# Patient Record
Sex: FEMALE | URBAN - METROPOLITAN AREA
[De-identification: names, ages, dates, MRNs, and addresses within clinical notes are randomized per-mention and may not be internally consistent; named-entity substitution may affect disease eponyms.]

---

## 2022-01-03 ENCOUNTER — TELEPHONE (OUTPATIENT)
Dept: ADMINISTRATIVE | Facility: OTHER | Age: 22
End: 2022-01-03

## 2022-01-04 ENCOUNTER — HOSPITAL ENCOUNTER (INPATIENT)
Facility: HOSPITAL | Age: 22
LOS: 9 days | Discharge: HOME | End: 2022-01-13
Attending: OBSTETRICS & GYNECOLOGY | Admitting: OBSTETRICS & GYNECOLOGY
Payer: MEDICAID

## 2022-01-04 DIAGNOSIS — O42.919 PRETERM PREMATURE RUPTURE OF MEMBRANES (PPROM) WITH UNKNOWN ONSET OF LABOR: Primary | ICD-10-CM

## 2022-01-04 DIAGNOSIS — R10.9 ABDOMINAL PAIN AFFECTING PREGNANCY: ICD-10-CM

## 2022-01-04 DIAGNOSIS — O26.899 ABDOMINAL PAIN AFFECTING PREGNANCY: ICD-10-CM

## 2022-01-04 LAB
ABO + RH BLD: NORMAL
ALBUMIN SERPL BCP-MCNC: 3 G/DL (ref 3.5–5.2)
ALP SERPL-CCNC: 79 U/L (ref 55–135)
ALT SERPL W/O P-5'-P-CCNC: 9 U/L (ref 10–44)
ANION GAP SERPL CALC-SCNC: 9 MMOL/L (ref 8–16)
AST SERPL-CCNC: 14 U/L (ref 10–40)
BASOPHILS # BLD AUTO: 0.01 K/UL (ref 0–0.2)
BASOPHILS NFR BLD: 0.1 % (ref 0–1.9)
BILIRUB SERPL-MCNC: 0.5 MG/DL (ref 0.1–1)
BLD GP AB SCN CELLS X3 SERPL QL: NORMAL
BUN SERPL-MCNC: 7 MG/DL (ref 6–20)
CALCIUM SERPL-MCNC: 8.9 MG/DL (ref 8.7–10.5)
CHLORIDE SERPL-SCNC: 106 MMOL/L (ref 95–110)
CO2 SERPL-SCNC: 21 MMOL/L (ref 23–29)
CREAT SERPL-MCNC: 0.6 MG/DL (ref 0.5–1.4)
DIFFERENTIAL METHOD: ABNORMAL
EOSINOPHIL # BLD AUTO: 0.1 K/UL (ref 0–0.5)
EOSINOPHIL NFR BLD: 1.3 % (ref 0–8)
ERYTHROCYTE [DISTWIDTH] IN BLOOD BY AUTOMATED COUNT: 13.3 % (ref 11.5–14.5)
EST. GFR  (AFRICAN AMERICAN): >60 ML/MIN/1.73 M^2
EST. GFR  (NON AFRICAN AMERICAN): >60 ML/MIN/1.73 M^2
GLUCOSE SERPL-MCNC: 114 MG/DL (ref 70–110)
HCT VFR BLD AUTO: 29.4 % (ref 37–48.5)
HGB BLD-MCNC: 9.8 G/DL (ref 12–16)
HIV1+2 IGG SERPL QL IA.RAPID: NORMAL
IMM GRANULOCYTES # BLD AUTO: 0.08 K/UL (ref 0–0.04)
IMM GRANULOCYTES NFR BLD AUTO: 0.9 % (ref 0–0.5)
LYMPHOCYTES # BLD AUTO: 2.2 K/UL (ref 1–4.8)
LYMPHOCYTES NFR BLD: 24.7 % (ref 18–48)
MCH RBC QN AUTO: 30.6 PG (ref 27–31)
MCHC RBC AUTO-ENTMCNC: 33.3 G/DL (ref 32–36)
MCV RBC AUTO: 92 FL (ref 82–98)
MONOCYTES # BLD AUTO: 0.7 K/UL (ref 0.3–1)
MONOCYTES NFR BLD: 8.4 % (ref 4–15)
NEUTROPHILS # BLD AUTO: 5.6 K/UL (ref 1.8–7.7)
NEUTROPHILS NFR BLD: 64.6 % (ref 38–73)
NRBC BLD-RTO: 0 /100 WBC
PLATELET # BLD AUTO: 215 K/UL (ref 150–450)
PMV BLD AUTO: 10.7 FL (ref 9.2–12.9)
POTASSIUM SERPL-SCNC: 3.8 MMOL/L (ref 3.5–5.1)
PROT SERPL-MCNC: 6.7 G/DL (ref 6–8.4)
RBC # BLD AUTO: 3.2 M/UL (ref 4–5.4)
SARS-COV-2 RDRP RESP QL NAA+PROBE: NEGATIVE
SODIUM SERPL-SCNC: 136 MMOL/L (ref 136–145)
WBC # BLD AUTO: 8.69 K/UL (ref 3.9–12.7)

## 2022-01-04 PROCEDURE — 36415 COLL VENOUS BLD VENIPUNCTURE: CPT | Performed by: OBSTETRICS & GYNECOLOGY

## 2022-01-04 PROCEDURE — 11000001 HC ACUTE MED/SURG PRIVATE ROOM

## 2022-01-04 PROCEDURE — 87340 HEPATITIS B SURFACE AG IA: CPT | Performed by: OBSTETRICS & GYNECOLOGY

## 2022-01-04 PROCEDURE — 86703 HIV-1/HIV-2 1 RESULT ANTBDY: CPT | Performed by: OBSTETRICS & GYNECOLOGY

## 2022-01-04 PROCEDURE — 86901 BLOOD TYPING SEROLOGIC RH(D): CPT | Performed by: OBSTETRICS & GYNECOLOGY

## 2022-01-04 PROCEDURE — 80307 DRUG TEST PRSMV CHEM ANLYZR: CPT | Performed by: OBSTETRICS & GYNECOLOGY

## 2022-01-04 PROCEDURE — 86762 RUBELLA ANTIBODY: CPT | Performed by: OBSTETRICS & GYNECOLOGY

## 2022-01-04 PROCEDURE — 86900 BLOOD TYPING SEROLOGIC ABO: CPT | Performed by: OBSTETRICS & GYNECOLOGY

## 2022-01-04 PROCEDURE — 80053 COMPREHEN METABOLIC PANEL: CPT | Performed by: OBSTETRICS & GYNECOLOGY

## 2022-01-04 PROCEDURE — 85025 COMPLETE CBC W/AUTO DIFF WBC: CPT | Performed by: OBSTETRICS & GYNECOLOGY

## 2022-01-04 PROCEDURE — U0002 COVID-19 LAB TEST NON-CDC: HCPCS | Performed by: OBSTETRICS & GYNECOLOGY

## 2022-01-04 PROCEDURE — 84112 EVAL AMNIOTIC FLUID PROTEIN: CPT | Performed by: OBSTETRICS & GYNECOLOGY

## 2022-01-04 PROCEDURE — 86592 SYPHILIS TEST NON-TREP QUAL: CPT | Performed by: OBSTETRICS & GYNECOLOGY

## 2022-01-04 RX ORDER — SODIUM CHLORIDE, SODIUM LACTATE, POTASSIUM CHLORIDE, CALCIUM CHLORIDE 600; 310; 30; 20 MG/100ML; MG/100ML; MG/100ML; MG/100ML
INJECTION, SOLUTION INTRAVENOUS CONTINUOUS
Status: DISCONTINUED | OUTPATIENT
Start: 2022-01-04 | End: 2022-01-05

## 2022-01-04 RX ORDER — ACETAMINOPHEN 500 MG
500 TABLET ORAL EVERY 6 HOURS PRN
Status: DISCONTINUED | OUTPATIENT
Start: 2022-01-04 | End: 2022-01-11 | Stop reason: ALTCHOICE

## 2022-01-04 RX ORDER — PROCHLORPERAZINE EDISYLATE 5 MG/ML
5 INJECTION INTRAMUSCULAR; INTRAVENOUS EVERY 6 HOURS PRN
Status: DISCONTINUED | OUTPATIENT
Start: 2022-01-04 | End: 2022-01-05

## 2022-01-04 RX ORDER — ONDANSETRON 8 MG/1
8 TABLET, ORALLY DISINTEGRATING ORAL EVERY 8 HOURS PRN
Status: DISCONTINUED | OUTPATIENT
Start: 2022-01-04 | End: 2022-01-05 | Stop reason: SDUPTHER

## 2022-01-05 ENCOUNTER — ANESTHESIA (OUTPATIENT)
Dept: OBSTETRICS AND GYNECOLOGY | Facility: HOSPITAL | Age: 22
End: 2022-01-05

## 2022-01-05 ENCOUNTER — ANESTHESIA EVENT (OUTPATIENT)
Dept: OBSTETRICS AND GYNECOLOGY | Facility: HOSPITAL | Age: 22
End: 2022-01-05

## 2022-01-05 ENCOUNTER — TELEPHONE (OUTPATIENT)
Dept: ADMINISTRATIVE | Facility: OTHER | Age: 22
End: 2022-01-05

## 2022-01-05 PROBLEM — O42.919 PRETERM PREMATURE RUPTURE OF MEMBRANES (PPROM) WITH UNKNOWN ONSET OF LABOR: Status: ACTIVE | Noted: 2022-01-05

## 2022-01-05 LAB
AMPHET+METHAMPHET UR QL: NEGATIVE
BARBITURATES UR QL SCN>200 NG/ML: NEGATIVE
BENZODIAZ UR QL SCN>200 NG/ML: NEGATIVE
BZE UR QL SCN: NEGATIVE
CANNABINOIDS UR QL SCN: NEGATIVE
CREAT UR-MCNC: 52 MG/DL (ref 15–325)
ETHANOL UR-MCNC: <10 MG/DL
HBV SURFACE AG SERPL QL IA: NEGATIVE
METHADONE UR QL SCN>300 NG/ML: NEGATIVE
OPIATES UR QL SCN: NEGATIVE
PCP UR QL SCN>25 NG/ML: NEGATIVE
RPR SER QL: NORMAL
RUBV IGG SER-ACNC: 29.3 IU/ML
RUBV IGG SER-IMP: REACTIVE
RUPTURE OF MEMBRANE: POSITIVE
TOXICOLOGY INFORMATION: NORMAL

## 2022-01-05 PROCEDURE — 87591 N.GONORRHOEAE DNA AMP PROB: CPT | Performed by: OBSTETRICS & GYNECOLOGY

## 2022-01-05 PROCEDURE — 63600175 PHARM REV CODE 636 W HCPCS: Performed by: OBSTETRICS & GYNECOLOGY

## 2022-01-05 PROCEDURE — 87081 CULTURE SCREEN ONLY: CPT | Performed by: OBSTETRICS & GYNECOLOGY

## 2022-01-05 PROCEDURE — 25000003 PHARM REV CODE 250: Performed by: OBSTETRICS & GYNECOLOGY

## 2022-01-05 PROCEDURE — 11000001 HC ACUTE MED/SURG PRIVATE ROOM

## 2022-01-05 PROCEDURE — 87491 CHLMYD TRACH DNA AMP PROBE: CPT | Performed by: OBSTETRICS & GYNECOLOGY

## 2022-01-05 RX ORDER — CALCIUM CARBONATE 200(500)MG
500 TABLET,CHEWABLE ORAL 3 TIMES DAILY PRN
Status: DISCONTINUED | OUTPATIENT
Start: 2022-01-05 | End: 2022-01-13 | Stop reason: HOSPADM

## 2022-01-05 RX ORDER — AZITHROMYCIN 500 MG/1
1000 TABLET, FILM COATED ORAL ONCE
Status: COMPLETED | OUTPATIENT
Start: 2022-01-05 | End: 2022-01-05

## 2022-01-05 RX ORDER — OXYTOCIN/RINGER'S LACTATE 30/500 ML
95 PLASTIC BAG, INJECTION (ML) INTRAVENOUS ONCE
Status: DISCONTINUED | OUTPATIENT
Start: 2022-01-05 | End: 2022-01-13 | Stop reason: HOSPADM

## 2022-01-05 RX ORDER — OXYTOCIN/RINGER'S LACTATE 30/500 ML
334 PLASTIC BAG, INJECTION (ML) INTRAVENOUS ONCE
Status: DISCONTINUED | OUTPATIENT
Start: 2022-01-05 | End: 2022-01-11 | Stop reason: ALTCHOICE

## 2022-01-05 RX ORDER — MISOPROSTOL 200 UG/1
800 TABLET ORAL
Status: DISCONTINUED | OUTPATIENT
Start: 2022-01-05 | End: 2022-01-11 | Stop reason: ALTCHOICE

## 2022-01-05 RX ORDER — SODIUM CHLORIDE 9 MG/ML
INJECTION, SOLUTION INTRAVENOUS
Status: DISCONTINUED | OUTPATIENT
Start: 2022-01-05 | End: 2022-01-11 | Stop reason: ALTCHOICE

## 2022-01-05 RX ORDER — BETAMETHASONE SODIUM PHOSPHATE AND BETAMETHASONE ACETATE 3; 3 MG/ML; MG/ML
6 INJECTION, SUSPENSION INTRA-ARTICULAR; INTRALESIONAL; INTRAMUSCULAR; SOFT TISSUE ONCE
Status: COMPLETED | OUTPATIENT
Start: 2022-01-05 | End: 2022-01-05

## 2022-01-05 RX ORDER — SODIUM CHLORIDE, SODIUM LACTATE, POTASSIUM CHLORIDE, CALCIUM CHLORIDE 600; 310; 30; 20 MG/100ML; MG/100ML; MG/100ML; MG/100ML
INJECTION, SOLUTION INTRAVENOUS CONTINUOUS
Status: DISCONTINUED | OUTPATIENT
Start: 2022-01-05 | End: 2022-01-07

## 2022-01-05 RX ORDER — MUPIROCIN 20 MG/G
OINTMENT TOPICAL
Status: DISCONTINUED | OUTPATIENT
Start: 2022-01-04 | End: 2022-01-13 | Stop reason: HOSPADM

## 2022-01-05 RX ORDER — SIMETHICONE 80 MG
1 TABLET,CHEWABLE ORAL 4 TIMES DAILY PRN
Status: DISCONTINUED | OUTPATIENT
Start: 2022-01-05 | End: 2022-01-11 | Stop reason: SDUPTHER

## 2022-01-05 RX ORDER — ONDANSETRON 8 MG/1
8 TABLET, ORALLY DISINTEGRATING ORAL EVERY 8 HOURS PRN
Status: DISCONTINUED | OUTPATIENT
Start: 2022-01-05 | End: 2022-01-11 | Stop reason: ALTCHOICE

## 2022-01-05 RX ORDER — AZITHROMYCIN 500 MG/1
1000 TABLET, FILM COATED ORAL ONCE
Status: COMPLETED | OUTPATIENT
Start: 2022-01-08 | End: 2022-01-08

## 2022-01-05 RX ORDER — TRANEXAMIC ACID 100 MG/ML
1000 INJECTION, SOLUTION INTRAVENOUS ONCE AS NEEDED
Status: DISCONTINUED | OUTPATIENT
Start: 2022-01-04 | End: 2022-01-11 | Stop reason: ALTCHOICE

## 2022-01-05 RX ORDER — DIPHENOXYLATE HYDROCHLORIDE AND ATROPINE SULFATE 2.5; .025 MG/1; MG/1
1 TABLET ORAL 4 TIMES DAILY PRN
Status: DISCONTINUED | OUTPATIENT
Start: 2022-01-05 | End: 2022-01-13 | Stop reason: HOSPADM

## 2022-01-05 RX ORDER — CARBOPROST TROMETHAMINE 250 UG/ML
250 INJECTION, SOLUTION INTRAMUSCULAR
Status: DISCONTINUED | OUTPATIENT
Start: 2022-01-05 | End: 2022-01-13 | Stop reason: HOSPADM

## 2022-01-05 RX ORDER — PROCHLORPERAZINE EDISYLATE 5 MG/ML
5 INJECTION INTRAMUSCULAR; INTRAVENOUS EVERY 6 HOURS PRN
Status: DISCONTINUED | OUTPATIENT
Start: 2022-01-05 | End: 2022-01-11 | Stop reason: ALTCHOICE

## 2022-01-05 RX ORDER — METHYLERGONOVINE MALEATE 0.2 MG/ML
200 INJECTION INTRAVENOUS
Status: DISCONTINUED | OUTPATIENT
Start: 2022-01-05 | End: 2022-01-11 | Stop reason: ALTCHOICE

## 2022-01-05 RX ORDER — FAMOTIDINE 20 MG/1
20 TABLET, FILM COATED ORAL DAILY
Status: DISCONTINUED | OUTPATIENT
Start: 2022-01-05 | End: 2022-01-11 | Stop reason: ALTCHOICE

## 2022-01-05 RX ADMIN — ERYTHROMYCIN LACTOBIONATE 250 MG: 500 INJECTION, POWDER, LYOPHILIZED, FOR SOLUTION INTRAVENOUS at 07:01

## 2022-01-05 RX ADMIN — AMPICILLIN 2 G: 2 INJECTION, POWDER, FOR SOLUTION INTRAMUSCULAR; INTRAVENOUS at 02:01

## 2022-01-05 RX ADMIN — AMPICILLIN 2 G: 2 INJECTION, POWDER, FOR SOLUTION INTRAMUSCULAR; INTRAVENOUS at 08:01

## 2022-01-05 RX ADMIN — BETAMETHASONE SODIUM PHOSPHATE AND BETAMETHASONE ACETATE 6 MG: 3; 3 INJECTION, SUSPENSION INTRA-ARTICULAR; INTRALESIONAL; INTRAMUSCULAR at 01:01

## 2022-01-05 RX ADMIN — AMPICILLIN 2 G: 2 INJECTION, POWDER, FOR SOLUTION INTRAMUSCULAR; INTRAVENOUS at 09:01

## 2022-01-05 RX ADMIN — FAMOTIDINE 20 MG: 20 TABLET ORAL at 05:01

## 2022-01-05 RX ADMIN — ERYTHROMYCIN LACTOBIONATE 250 MG: 500 INJECTION, POWDER, LYOPHILIZED, FOR SOLUTION INTRAVENOUS at 01:01

## 2022-01-05 RX ADMIN — SODIUM CHLORIDE, SODIUM LACTATE, POTASSIUM CHLORIDE, AND CALCIUM CHLORIDE: .6; .31; .03; .02 INJECTION, SOLUTION INTRAVENOUS at 01:01

## 2022-01-05 RX ADMIN — AZITHROMYCIN 1000 MG: 500 TABLET, FILM COATED ORAL at 09:01

## 2022-01-05 NOTE — NURSING
Pt called out and stated she was having cramping in her belly 7/10 pain that started about 20min ago.     Belly soft and slightly tender. No contractions noted or palpated. No bleeding noted.

## 2022-01-05 NOTE — SUBJECTIVE & OBJECTIVE
"Obstetric HPI:  Patient is a 21 year old  female with no known PMHx who presents with suspected PPROM. Patient reports that she was in he rusual state of health until the day of presentation during which she reported feeling a significant amount of loss of fluid as well as contractions prompting her to present to the hospital. Denies any other recent complaints or problems. Feeling fetal movement. Patient had not received any prenatal care throughout this pregnancy including no US but stated that she thinks she is about 37 weeks pregnant. Denies noticing any problems throughout this pregnancy. Reports two previous pregnancies both delivered vaginally around 39 weeks. Denies any complications with those pregnancies.      US upon arrival here revealed "Live intrauterine gestation in cephalic presentation with a composite gestational age by ultrasound of 33 weeks.  Estimated fetal weight of 2063 grams +/-302 grams or 4 pounds 9 ounces +/-11 ounces.  Amniotic fluid index of 6.9 cm."     OB History    Para Term  AB Living   3 2 0 0 0 2   SAB IAB Ectopic Multiple Live Births   0 0 0 0 0      # Outcome Date GA Lbr Maksim/2nd Weight Sex Delivery Anes PTL Lv   3 Current            2 Para      Vag-Spont      1 Para      Vag-Spont        No past medical history on file.  No past surgical history on file.    No medications prior to admission.       Review of patient's allergies indicates:  No Known Allergies     Family History    None       Tobacco Use    Smoking status: Never Smoker    Smokeless tobacco: Never Used   Substance and Sexual Activity    Alcohol use: Never    Drug use: Never    Sexual activity: Yes     Partners: Male     Review of Systems   Constitutional: Negative for appetite change, chills, diaphoresis, fatigue and fever.   Respiratory: Negative for cough and shortness of breath.    Cardiovascular: Negative for chest pain and palpitations.   Gastrointestinal: Negative for constipation, " diarrhea, nausea and vomiting.   Musculoskeletal: Negative for back pain and myalgias.   Neurological: Negative for headaches.   Psychiatric/Behavioral: Negative for depression.      Objective:     Vital Signs (Most Recent):  Temp: 98.1 °F (36.7 °C) (01/05/22 0720)  Pulse: 82 (01/05/22 0645)  BP: (!) 101/56 (01/05/22 0645)  SpO2: 99 % (01/05/22 0645) Vital Signs (24h Range):  Temp:  [98.1 °F (36.7 °C)-98.6 °F (37 °C)] 98.1 °F (36.7 °C)  Pulse:  [82] 82  SpO2:  [99 %] 99 %  BP: (101)/(56) 101/56        There is no height or weight on file to calculate BMI.    Physical Exam:   Constitutional: She is oriented to person, place, and time. She appears well-developed and well-nourished. No distress.    HENT:   Head: Normocephalic and atraumatic.    Eyes: Pupils are equal, round, and reactive to light. EOM are normal.     Cardiovascular: Normal rate, regular rhythm and normal heart sounds.     Pulmonary/Chest: Effort normal. No respiratory distress.        Abdominal: Soft.             Musculoskeletal: Normal range of motion.       Neurological: She is alert and oriented to person, place, and time.    Skin: Skin is warm and dry. She is not diaphoretic.        Cervix:  Dilation:  2  Effacement:  50%  Station: -2     Significant Labs:  Lab Results   Component Value Date    GROUPTRH A POS 01/04/2022    HEPBSAG Negative 01/04/2022       CBC:   Recent Labs   Lab 01/04/22  2145   WBC 8.69   RBC 3.20*   HGB 9.8*   HCT 29.4*      MCV 92   MCH 30.6   MCHC 33.3     CMP:   Recent Labs   Lab 01/04/22  2209   *   CALCIUM 8.9   ALBUMIN 3.0*   PROT 6.7      K 3.8   CO2 21*      BUN 7   CREATININE 0.6   ALKPHOS 79   ALT 9*   AST 14   BILITOT 0.5     I have personallly reviewed all pertinent lab results from the last 24 hours.

## 2022-01-05 NOTE — NURSING
Dr. Garcia at bedside and used  service to update patient on plan of care and answer quesitons. Patient then up to bathroom.

## 2022-01-05 NOTE — NURSING
Called Dr Garcia about diet for patient and to verify bathroom privileges.     Regular diet ordered.     Updated antibiotic regimine to azithromycin 1g today and 1g Saturday. D/c erythromycin. Cont ampicillin.     Induction scheduled for Monday 1/10

## 2022-01-05 NOTE — ASSESSMENT & PLAN NOTE
Patient with PPROM. Currently at 33w1d per US. No prenatal care during this pregnancy.    - Continue expectant management   - Ampicillin 2g IV q6hr for 2 days   - Azithromycin 1000mg today and on day 5 of admission   - NST BID   - vital signs q4hr    - pelvic rest

## 2022-01-05 NOTE — H&P
"[facility] - Labor & Delivery  Obstetrics  History & Physical    Patient Name: Violette Bernal  MRN: [id number]  Admission Date: 2022  Primary Care Provider: Primary Doctor No    Subjective:     Principal Problem: premature rupture of membranes (PPROM) with unknown onset of labor    History of Present Illness:  Patient is a 21 year old  female with no known PMHx who presents with suspected PPROM. Patient reports that she was in he rusual state of health until the day of presentation during which she reported feeling a significant amount of loss of fluid as well as contractions prompting her to present to the hospital. Denies any other recent complaints or problems. Feeling fetal movement. Patient had not received any prenatal care throughout this pregnancy including no US but stated that she thinks she is about 37 weeks pregnant. Denies noticing any problems throughout this pregnancy. Reports two previous pregnancies both delivered vaginally around 39 weeks. Denies any complications with those pregnancies.      US upon arrival here revealed "Live intrauterine gestation in cephalic presentation with a composite gestational age by ultrasound of 33 weeks.  Estimated fetal weight of 2063 grams +/-302 grams or 4 pounds 9 ounces +/-11 ounces.  Amniotic fluid index of 6.9 cm."       OB History    Para Term  AB Living   3 2 0 0 0 2   SAB IAB Ectopic Multiple Live Births   0 0 0 0 0      # Outcome Date GA Lbr Maksim/2nd Weight Sex Delivery Anes PTL Lv   3 Current            2 Para      Vag-Spont      1 Para      Vag-Spont        No past medical history on file.  No past surgical history on file.    No medications prior to admission.       Review of patient's allergies indicates:  No Known Allergies     Family History    None       Tobacco Use    Smoking status: Never Smoker    Smokeless tobacco: Never Used   Substance and Sexual Activity    Alcohol use: Never    Drug use: Never    Sexual " activity: Yes     Partners: Male     Review of Systems   Constitutional: Negative for appetite change, chills, diaphoresis, fatigue and fever.   Respiratory: Negative for cough and shortness of breath.    Cardiovascular: Negative for chest pain and palpitations.   Gastrointestinal: Negative for constipation, diarrhea, nausea and vomiting.   Musculoskeletal: Negative for back pain and myalgias.   Neurological: Negative for headaches.   Psychiatric/Behavioral: Negative for depression.      Objective:     Vital Signs (Most Recent):  Temp: 98.1 °F (36.7 °C) (01/05/22 0720)  Pulse: 82 (01/05/22 0645)  BP: (!) 101/56 (01/05/22 0645)  SpO2: 99 % (01/05/22 0645) Vital Signs (24h Range):  Temp:  [98.1 °F (36.7 °C)-98.6 °F (37 °C)] 98.1 °F (36.7 °C)  Pulse:  [82] 82  SpO2:  [99 %] 99 %  BP: (101)/(56) 101/56        There is no height or weight on file to calculate BMI.    Physical Exam:   Constitutional: She is oriented to person, place, and time. She appears well-developed and well-nourished. No distress.    HENT:   Head: Normocephalic and atraumatic.    Eyes: Pupils are equal, round, and reactive to light. EOM are normal.     Cardiovascular: Normal rate, regular rhythm and normal heart sounds.     Pulmonary/Chest: Effort normal. No respiratory distress.        Abdominal: Soft.             Musculoskeletal: Normal range of motion.       Neurological: She is alert and oriented to person, place, and time.    Skin: Skin is warm and dry. She is not diaphoretic.        Cervix:  Dilation:  2  Effacement:  50%  Station: -2     Significant Labs:  Lab Results   Component Value Date    GROUPTRH A POS 01/04/2022    HEPBSAG Negative 01/04/2022       CBC:   Recent Labs   Lab 01/04/22  2145   WBC 8.69   RBC 3.20*   HGB 9.8*   HCT 29.4*      MCV 92   MCH 30.6   MCHC 33.3     CMP:   Recent Labs   Lab 01/04/22  2209   *   CALCIUM 8.9   ALBUMIN 3.0*   PROT 6.7      K 3.8   CO2 21*      BUN 7   CREATININE 0.6   ALKPHOS  79   ALT 9*   AST 14   BILITOT 0.5     I have personallly reviewed all pertinent lab results from the last 24 hours.    Assessment/Plan:     21 y.o. female  at 33w1d for:    *  premature rupture of membranes (PPROM) with unknown onset of labor  Patient with PPROM. Currently at 33w1d per US. No prenatal care during this pregnancy.    - Continue expectant management   - Ampicillin 2g IV q6hr for 2 days   - Azithromycin 1000mg today and on day 5 of admission   - NST BID   - vital signs q4hr    - pelvic rest        Bravo Denise MD  Obstetrics  [facility] - Labor & Delivery

## 2022-01-05 NOTE — ANESTHESIA PREPROCEDURE EVALUATION
01/05/2022  Violette Bernal is a 21 y.o., female w/ IUP.     Anesthesia Evaluation    I have reviewed the Patient Summary Reports.    I have reviewed the Nursing Notes.       Review of Systems  Anesthesia Hx:  No previous Anesthesia    Hematology/Oncology:         -- Anemia:   EENT/Dental:EENT/Dental Normal   Cardiovascular:  Cardiovascular Normal Exercise tolerance: good     Pulmonary:  Pulmonary Normal    Renal/:  Renal/ Normal     Hepatic/GI:  Hepatic/GI Normal    Neurological:  Neurology Normal    Endocrine:  Endocrine Normal    Psych:  Psychiatric Normal           Physical Exam  General:  Well nourished    Airway/Jaw/Neck:  Airway Findings: Mouth Opening: Normal Tongue: Normal  General Airway Assessment: Adult  Mallampati: I  TM Distance: Normal, at least 6 cm         Dental:  Dental Findings: In tact   Chest/Lungs:  Chest/Lungs Clear    Heart/Vascular:  Heart Findings: Normal       Mental Status:  Mental Status Findings:  Cooperative, Alert and Oriented         Anesthesia Plan  Type of Anesthesia, risks & benefits discussed:  Anesthesia Type:  CSE, epidural, general, spinal    Patient's Preference:   Plan Factors:          Intra-op Monitoring Plan: standard ASA monitors  Intra-op Monitoring Plan Comments:   Post Op Pain Control Plan: multimodal analgesia  Post Op Pain Control Plan Comments:     Induction:   IV  Beta Blocker:  Patient is not currently on a Beta-Blocker (No further documentation required).       Informed Consent: Patient understands risks and agrees with Anesthesia plan.  Questions answered.   ASA Score: 2     Day of Surgery Review of History & Physical: I have interviewed and examined the patient. I have reviewed the patient's H&P dated:  There are no significant changes.          Ready For Surgery From Anesthesia Perspective.

## 2022-01-05 NOTE — HPI
"Patient is a 21 year old  female with no known PMHx who presents with suspected PPROM. Patient reports that she was in he rusual state of health until the day of presentation during which she reported feeling a significant amount of loss of fluid as well as contractions prompting her to present to the hospital. Denies any other recent complaints or problems. Feeling fetal movement. Patient had not received any prenatal care throughout this pregnancy including no US but stated that she thinks she is about 37 weeks pregnant. Denies noticing any problems throughout this pregnancy. Reports two previous pregnancies both delivered vaginally around 39 weeks. Denies any complications with those pregnancies.      US upon arrival here revealed "Live intrauterine gestation in cephalic presentation with a composite gestational age by ultrasound of 33 weeks.  Estimated fetal weight of 2063 grams +/-302 grams or 4 pounds 9 ounces +/-11 ounces.  Amniotic fluid index of 6.9 cm."   "

## 2022-01-05 NOTE — NURSING
[facility]  used to triage patient. Pt to triage with c/o LOF and contractions since yesterday.Pt states she has not had an ultrasound or OB visit during this pregnancy and states it is her 3rd pregnancy with all vaginal deliveries. She thinks she is around 37 weeks and she does not know what the gender is.ROM + sent with positive results. MD Garcia notified and orders given for bedside ultrasound.

## 2022-01-06 PROCEDURE — 99232 PR SUBSEQUENT HOSPITAL CARE,LEVL II: ICD-10-PCS | Mod: ,,, | Performed by: OBSTETRICS & GYNECOLOGY

## 2022-01-06 PROCEDURE — 96374 THER/PROPH/DIAG INJ IV PUSH: CPT

## 2022-01-06 PROCEDURE — 25000003 PHARM REV CODE 250: Performed by: OBSTETRICS & GYNECOLOGY

## 2022-01-06 PROCEDURE — 11000001 HC ACUTE MED/SURG PRIVATE ROOM

## 2022-01-06 PROCEDURE — 96375 TX/PRO/DX INJ NEW DRUG ADDON: CPT

## 2022-01-06 PROCEDURE — 96376 TX/PRO/DX INJ SAME DRUG ADON: CPT

## 2022-01-06 PROCEDURE — 96365 THER/PROPH/DIAG IV INF INIT: CPT

## 2022-01-06 PROCEDURE — 96366 THER/PROPH/DIAG IV INF ADDON: CPT

## 2022-01-06 PROCEDURE — 99232 SBSQ HOSP IP/OBS MODERATE 35: CPT | Mod: ,,, | Performed by: OBSTETRICS & GYNECOLOGY

## 2022-01-06 PROCEDURE — 63600175 PHARM REV CODE 636 W HCPCS: Performed by: OBSTETRICS & GYNECOLOGY

## 2022-01-06 RX ADMIN — AMPICILLIN 2 G: 2 INJECTION, POWDER, FOR SOLUTION INTRAMUSCULAR; INTRAVENOUS at 05:01

## 2022-01-06 RX ADMIN — SODIUM CHLORIDE, SODIUM LACTATE, POTASSIUM CHLORIDE, AND CALCIUM CHLORIDE: .6; .31; .03; .02 INJECTION, SOLUTION INTRAVENOUS at 05:01

## 2022-01-06 RX ADMIN — AMPICILLIN 2 G: 2 INJECTION, POWDER, FOR SOLUTION INTRAMUSCULAR; INTRAVENOUS at 04:01

## 2022-01-06 RX ADMIN — FAMOTIDINE 20 MG: 20 TABLET ORAL at 08:01

## 2022-01-06 RX ADMIN — AMPICILLIN 2 G: 2 INJECTION, POWDER, FOR SOLUTION INTRAMUSCULAR; INTRAVENOUS at 11:01

## 2022-01-06 NOTE — PROGRESS NOTES
"[facility] - Labor & Delivery  Obstetrics  Antepartum Progress Note    Patient Name: Violette Bernal  MRN: [id number]  Admission Date: 2022  Hospital Length of Stay: 1 days  Attending Physician: Roger Garcia MD  Primary Care Provider: Primary Doctor No    Subjective:     Principal Problem: premature rupture of membranes (PPROM) with unknown onset of labor    HPI:  Patient is a 21 year old  female with no known PMHx who presents with suspected PPROM. Patient reports that she was in he rusual state of health until the day of presentation during which she reported feeling a significant amount of loss of fluid as well as contractions prompting her to present to the hospital. Denies any other recent complaints or problems. Feeling fetal movement. Patient had not received any prenatal care throughout this pregnancy including no US but stated that she thinks she is about 37 weeks pregnant. Denies noticing any problems throughout this pregnancy. Reports two previous pregnancies both delivered vaginally around 39 weeks. Denies any complications with those pregnancies.      US upon arrival here revealed "Live intrauterine gestation in cephalic presentation with a composite gestational age by ultrasound of 33 weeks.  Estimated fetal weight of 2063 grams +/-302 grams or 4 pounds 9 ounces +/-11 ounces.  Amniotic fluid index of 6.9 cm."       Obstetric HPI:  Patient reports None contractions, active fetal movement, absent vaginal bleeding , absent loss of fluid since yesterday     Objective:     Vital Signs (Most Recent):  Temp: 98.1 °F (36.7 °C) (22 0830)  Pulse: 86 (22 0600)  Resp: 18 (22)  BP: (!) 113/59 (22 010)  SpO2: 95 % (22 0600) Vital Signs (24h Range):  Temp:  [98.1 °F (36.7 °C)-98.7 °F (37.1 °C)] 98.1 °F (36.7 °C)  Pulse:  [] 86  Resp:  [18] 18  SpO2:  [95 %-100 %] 95 %  BP: (106-113)/(59-60) 113/59        There is no height or weight on file to calculate " BMI.    No intake or output data in the 24 hours ending 22 0852    Cervical Exam:  Dilation:  2  Effacement:  50%  Station: -2     Significant Labs:  Recent Lab Results     None          Physical Exam:   Constitutional: She is oriented to person, place, and time. She appears well-developed and well-nourished. No distress.    HENT:   Head: Normocephalic and atraumatic.      Cardiovascular: Normal rate, regular rhythm and normal heart sounds.     Pulmonary/Chest: Effort normal. No respiratory distress. She has no wheezes.        Abdominal: Soft. There is no abdominal tenderness.             Musculoskeletal: Normal range of motion.       Neurological: She is alert and oriented to person, place, and time.    Skin: Skin is warm and dry. She is not diaphoretic.        Assessment/Plan:     21 y.o. female  at 33w2d for:    *  premature rupture of membranes (PPROM) with unknown onset of labor  Patient with PPROM. Currently at 33w2d per US. No prenatal care during this pregnancy. Patient is overall doing well without any new complaints since yesterday. No contractions over last 24hrs.    - Continue expectant management until 34 weeks then plan for induction ([facility]ent[facility] plans for  [facility] assuming no s/s suggestive of infection or fetal compromise before then)   - Ampicillin 2g IV q6hr for 1 more days   - Azithromycin 1000mg on day 5 of admission (first dose already given)   - pelvic rest          Bravo Denise MD PGY-3  Obstetrics  [facility] - Labor & Delivery

## 2022-01-06 NOTE — SUBJECTIVE & OBJECTIVE
Obstetric HPI:  Patient reports None contractions, active fetal movement, absent vaginal bleeding , absent loss of fluid since yesterday     Objective:     Vital Signs (Most Recent):  Temp: 98.1 °F (36.7 °C) (01/06/22 0830)  Pulse: 86 (01/06/22 0600)  Resp: 18 (01/06/22 0102)  BP: (!) 113/59 (01/06/22 0102)  SpO2: 95 % (01/06/22 0600) Vital Signs (24h Range):  Temp:  [98.1 °F (36.7 °C)-98.7 °F (37.1 °C)] 98.1 °F (36.7 °C)  Pulse:  [] 86  Resp:  [18] 18  SpO2:  [95 %-100 %] 95 %  BP: (106-113)/(59-60) 113/59        There is no height or weight on file to calculate BMI.    No intake or output data in the 24 hours ending 01/06/22 0852    Cervical Exam:  Dilation:  2  Effacement:  50%  Station: -2     Significant Labs:  Recent Lab Results     None          Physical Exam:   Constitutional: She is oriented to person, place, and time. She appears well-developed and well-nourished. No distress.    HENT:   Head: Normocephalic and atraumatic.      Cardiovascular: Normal rate, regular rhythm and normal heart sounds.     Pulmonary/Chest: Effort normal. No respiratory distress. She has no wheezes.        Abdominal: Soft. There is no abdominal tenderness.             Musculoskeletal: Normal range of motion.       Neurological: She is alert and oriented to person, place, and time.    Skin: Skin is warm and dry. She is not diaphoretic.

## 2022-01-06 NOTE — ASSESSMENT & PLAN NOTE
Patient with PPROM. Currently at 33w2d per US. No prenatal care during this pregnancy. Patient is overall doing well without any new complaints since yesterday. No contractions over last 24hrs.    - Continue expectant management until 34 weeks then plan for induction ([facility]ent[facility] plans [place] [facility] [facility] assuming no s/s suggestive of infection or fetal compromise before then)   - Ampicillin 2g IV q6hr for 1 more days   - Azithromycin 1000mg on day 5 of admission (first dose already given)   - pelvic rest

## 2022-01-07 LAB — BACTERIA SPEC AEROBE CULT: NORMAL

## 2022-01-07 PROCEDURE — 99233 PR SUBSEQUENT HOSPITAL CARE,LEVL III: ICD-10-PCS | Mod: 25,,, | Performed by: OBSTETRICS & GYNECOLOGY

## 2022-01-07 PROCEDURE — 99233 SBSQ HOSP IP/OBS HIGH 50: CPT | Mod: 25,,, | Performed by: OBSTETRICS & GYNECOLOGY

## 2022-01-07 PROCEDURE — 59025 PR FETAL 2N-STRESS TEST: ICD-10-PCS | Mod: 26,,, | Performed by: OBSTETRICS & GYNECOLOGY

## 2022-01-07 PROCEDURE — 25000003 PHARM REV CODE 250: Performed by: OBSTETRICS & GYNECOLOGY

## 2022-01-07 PROCEDURE — 11000001 HC ACUTE MED/SURG PRIVATE ROOM

## 2022-01-07 PROCEDURE — 25000003 PHARM REV CODE 250: Performed by: STUDENT IN AN ORGANIZED HEALTH CARE EDUCATION/TRAINING PROGRAM

## 2022-01-07 PROCEDURE — 63600175 PHARM REV CODE 636 W HCPCS: Performed by: OBSTETRICS & GYNECOLOGY

## 2022-01-07 PROCEDURE — 59025 FETAL NON-STRESS TEST: CPT | Mod: 26,,, | Performed by: OBSTETRICS & GYNECOLOGY

## 2022-01-07 RX ORDER — AMOXICILLIN 250 MG/1
500 CAPSULE ORAL EVERY 8 HOURS
Status: DISCONTINUED | OUTPATIENT
Start: 2022-01-07 | End: 2022-01-11 | Stop reason: ALTCHOICE

## 2022-01-07 RX ADMIN — AMOXICILLIN 500 MG: 250 CAPSULE ORAL at 09:01

## 2022-01-07 RX ADMIN — ACETAMINOPHEN 500 MG: 500 TABLET ORAL at 03:01

## 2022-01-07 RX ADMIN — FAMOTIDINE 20 MG: 20 TABLET ORAL at 09:01

## 2022-01-07 RX ADMIN — AMPICILLIN 2 G: 2 INJECTION, POWDER, FOR SOLUTION INTRAMUSCULAR; INTRAVENOUS at 05:01

## 2022-01-07 RX ADMIN — AMOXICILLIN 500 MG: 250 CAPSULE ORAL at 05:01

## 2022-01-07 NOTE — PROGRESS NOTES
"[facility] - Labor & Delivery  Obstetrics  Antepartum Progress Note    Patient Name: Violette Bernal  MRN: [id number]  Admission Date: 2022  Hospital Length of Stay: 2 days  Attending Physician: Roger Garcia MD  Primary Care Provider: Primary Doctor No    Subjective:     Principal Problem: premature rupture of membranes (PPROM) with unknown onset of labor    HPI:  Patient is a 21 year old  female with no known PMHx who presents with suspected PPROM. Patient reports that she was in he rusual state of health until the day of presentation during which she reported feeling a significant amount of loss of fluid as well as contractions prompting her to present to the hospital. Denies any other recent complaints or problems. Feeling fetal movement. Patient had not received any prenatal care throughout this pregnancy including no US but stated that she thinks she is about 37 weeks pregnant. Denies noticing any problems throughout this pregnancy. Reports two previous pregnancies both delivered vaginally around 39 weeks. Denies any complications with those pregnancies.      US upon arrival here revealed "Live intrauterine gestation in cephalic presentation with a composite gestational age by ultrasound of 33 weeks.  Estimated fetal weight of 2063 grams +/-302 grams or 4 pounds 9 ounces +/-11 ounces.  Amniotic fluid index of 6.9 cm."       Hospital Course:  No notes on file    Obstetric HPI:  Patient reports None contractions, active fetal movement, absent vaginal bleeding , absent loss of fluid since yesterday. Denies any pain. Denies any new complaints since yesterday.      Objective:     Vital Signs (Most Recent):  Temp: 98.4 °F (36.9 °C) (22)  Pulse: 80 (22)  Resp: 15 (22)  BP: (!) 91/51 (22)  SpO2: 96 % (22) Vital Signs (24h Range):  Temp:  [98.1 °F (36.7 °C)-98.9 °F (37.2 °C)] 98.4 °F (36.9 °C)  Pulse:  [80-89] 80  Resp:  [15] 15  SpO2:  [94 %-96 " %] 96 %  BP: ()/(51-60) 91/51        There is no height or weight on file to calculate BMI.      No intake or output data in the 24 hours ending 22 0746    Cervical Exam:  Dilation:  2  Effacement:  50%  Station: -2     Significant Labs:  Recent Lab Results     None          Physical Exam:   Constitutional: She is oriented to person, place, and time. She appears well-developed and well-nourished. No distress.    HENT:   Head: Normocephalic and atraumatic.    Eyes: Pupils are equal, round, and reactive to light. EOM are normal.     Cardiovascular: Normal rate, regular rhythm and normal heart sounds.     Pulmonary/Chest: Effort normal. No respiratory distress.        Abdominal: Soft. There is no abdominal tenderness.             Musculoskeletal: Normal range of motion.       Neurological: She is alert and oriented to person, place, and time.    Skin: Skin is warm and dry. She is not diaphoretic.        Assessment/Plan:     21 y.o. female  at 33w3d for:    *  premature rupture of membranes (PPROM) with unknown onset of labor  Patient with PPROM. Currently at 33w2d per US. No prenatal care during this pregnancy. Patient is overall doing well without any new complaints.    - Continue expectant management until 34 weeks then plan for induction ([facility]ent[facility] plans for  [place] [facility] assuming no s/s suggestive of infection or fetal compromise before then)   - Ampicillin 2g IV q6hr completed   - Azithromycin 1000mg on day 5 of admission (first dose already given)   - mIVF   - pelvic rest          Bravo Denise MD PGY-3  Obstetrics  [facility] - Labor & Delivery

## 2022-01-07 NOTE — SUBJECTIVE & OBJECTIVE
Obstetric HPI:  Patient reports None contractions, active fetal movement, absent vaginal bleeding , absent loss of fluid since yesterday. Denies any pain. Denies any new complaints since yesterday.      Objective:     Vital Signs (Most Recent):  Temp: 98.4 °F (36.9 °C) (01/07/22 0740)  Pulse: 80 (01/07/22 0740)  Resp: 15 (01/07/22 0740)  BP: (!) 91/51 (01/07/22 0740)  SpO2: 96 % (01/07/22 0740) Vital Signs (24h Range):  Temp:  [98.1 °F (36.7 °C)-98.9 °F (37.2 °C)] 98.4 °F (36.9 °C)  Pulse:  [80-89] 80  Resp:  [15] 15  SpO2:  [94 %-96 %] 96 %  BP: ()/(51-60) 91/51        There is no height or weight on file to calculate BMI.      No intake or output data in the 24 hours ending 01/07/22 0746    Cervical Exam:  Dilation:  2  Effacement:  50%  Station: -2     Significant Labs:  Recent Lab Results     None          Physical Exam:   Constitutional: She is oriented to person, place, and time. She appears well-developed and well-nourished. No distress.    HENT:   Head: Normocephalic and atraumatic.    Eyes: Pupils are equal, round, and reactive to light. EOM are normal.     Cardiovascular: Normal rate, regular rhythm and normal heart sounds.     Pulmonary/Chest: Effort normal. No respiratory distress.        Abdominal: Soft. There is no abdominal tenderness.             Musculoskeletal: Normal range of motion.       Neurological: She is alert and oriented to person, place, and time.    Skin: Skin is warm and dry. She is not diaphoretic.

## 2022-01-07 NOTE — ASSESSMENT & PLAN NOTE
Patient with PPROM. Currently at 33w2d per US. No prenatal care during this pregnancy. Patient is overall doing well without any new complaints.    - Continue expectant management until 34 weeks then plan for induction ([place] plans [place] [facility] [facility] assuming no s/s suggestive of infection or fetal compromise before then)   - Ampicillin 2g IV q6hr completed   - Azithromycin 1000mg on day 5 of admission (first dose already given)   - mIVF   - pelvic rest

## 2022-01-07 NOTE — PLAN OF CARE
1155 Dr Adams at bedside, CALOS used [id number], explained expectant management and plan to induce on Monday evening after 7 days of antibiotic finished. Pt denies pain, reports small amount of leaking fluid, no blood noted. Reviewed steroids.

## 2022-01-08 LAB
BASOPHILS # BLD AUTO: 0.02 K/UL (ref 0–0.2)
BASOPHILS NFR BLD: 0.3 % (ref 0–1.9)
DIFFERENTIAL METHOD: ABNORMAL
EOSINOPHIL # BLD AUTO: 0.1 K/UL (ref 0–0.5)
EOSINOPHIL NFR BLD: 0.8 % (ref 0–8)
ERYTHROCYTE [DISTWIDTH] IN BLOOD BY AUTOMATED COUNT: 13.8 % (ref 11.5–14.5)
HCT VFR BLD AUTO: 29.5 % (ref 37–48.5)
HGB BLD-MCNC: 9.6 G/DL (ref 12–16)
IMM GRANULOCYTES # BLD AUTO: 0.1 K/UL (ref 0–0.04)
IMM GRANULOCYTES NFR BLD AUTO: 1.5 % (ref 0–0.5)
LYMPHOCYTES # BLD AUTO: 1 K/UL (ref 1–4.8)
LYMPHOCYTES NFR BLD: 15.6 % (ref 18–48)
MCH RBC QN AUTO: 30.1 PG (ref 27–31)
MCHC RBC AUTO-ENTMCNC: 32.5 G/DL (ref 32–36)
MCV RBC AUTO: 93 FL (ref 82–98)
MONOCYTES # BLD AUTO: 0.6 K/UL (ref 0.3–1)
MONOCYTES NFR BLD: 9.6 % (ref 4–15)
NEUTROPHILS # BLD AUTO: 4.7 K/UL (ref 1.8–7.7)
NEUTROPHILS NFR BLD: 72.2 % (ref 38–73)
NRBC BLD-RTO: 0 /100 WBC
PLATELET # BLD AUTO: 185 K/UL (ref 150–450)
PMV BLD AUTO: 10.8 FL (ref 9.2–12.9)
RBC # BLD AUTO: 3.19 M/UL (ref 4–5.4)
WBC # BLD AUTO: 6.55 K/UL (ref 3.9–12.7)

## 2022-01-08 PROCEDURE — 25000003 PHARM REV CODE 250: Performed by: STUDENT IN AN ORGANIZED HEALTH CARE EDUCATION/TRAINING PROGRAM

## 2022-01-08 PROCEDURE — 99232 PR SUBSEQUENT HOSPITAL CARE,LEVL II: ICD-10-PCS | Mod: ,,, | Performed by: OBSTETRICS & GYNECOLOGY

## 2022-01-08 PROCEDURE — 85025 COMPLETE CBC W/AUTO DIFF WBC: CPT | Performed by: OBSTETRICS & GYNECOLOGY

## 2022-01-08 PROCEDURE — 25000003 PHARM REV CODE 250: Performed by: OBSTETRICS & GYNECOLOGY

## 2022-01-08 PROCEDURE — 36415 COLL VENOUS BLD VENIPUNCTURE: CPT | Performed by: OBSTETRICS & GYNECOLOGY

## 2022-01-08 PROCEDURE — 11000001 HC ACUTE MED/SURG PRIVATE ROOM

## 2022-01-08 PROCEDURE — 99232 SBSQ HOSP IP/OBS MODERATE 35: CPT | Mod: ,,, | Performed by: OBSTETRICS & GYNECOLOGY

## 2022-01-08 RX ADMIN — AZITHROMYCIN 1000 MG: 500 TABLET, FILM COATED ORAL at 01:01

## 2022-01-08 RX ADMIN — AMOXICILLIN 500 MG: 250 CAPSULE ORAL at 01:01

## 2022-01-08 RX ADMIN — AMOXICILLIN 500 MG: 250 CAPSULE ORAL at 09:01

## 2022-01-08 RX ADMIN — ACETAMINOPHEN 500 MG: 500 TABLET ORAL at 05:01

## 2022-01-08 RX ADMIN — FAMOTIDINE 20 MG: 20 TABLET ORAL at 09:01

## 2022-01-08 RX ADMIN — AMOXICILLIN 500 MG: 250 CAPSULE ORAL at 05:01

## 2022-01-08 NOTE — PROGRESS NOTES
[facility]  Obstetrics  Antepartum Progress Note    Patient Name: Violette Bernal  MRN: [id number]  Admission Date: 2022  Hospital Length of Stay: 3 days  Attending Physician: Roger Garcia MD  Primary Care Provider: Primary Doctor No    Subjective:     Principal Problem: premature rupture of membranes (PPROM) with unknown onset of labor    HPI: Violette Bernal is a 21 y.o. female  with no prenatal care who presented on 2021 with complaint of significant amount of fluid and contractions. By ultrasound, patient 33 weeks with an RADHA of 2022.      Obstetric HPI:  Patient reports no contractions, active fetal movement, absent vaginal bleeding , absent loss of fluid.       Objective:     Vital Signs (Most Recent):  Temp: 98.6 °F (37 °C) (22 0818)  Pulse: 104 (22 0154)  Resp: 16 (22 1559)  BP: 113/65 (22 0154)  SpO2: (!) 94 % (22 0925) Vital Signs (24h Range):  Temp:  [97.8 °F (36.6 °C)-99.1 °F (37.3 °C)] 98.6 °F (37 °C)  Pulse:  [] 104  Resp:  [16] 16  SpO2:  [94 %] 94 %  BP: (105-113)/(56-65) 113/65        There is no height or weight on file to calculate BMI.    FHT: 155 Cat 1 (reassuring)  TOCO:  Q 0 minutes    No intake or output data in the 24 hours ending 22 0911    Physical Exam:   Constitutional: She is oriented to person, place, and time. She appears well-developed and well-nourished.       Cardiovascular: Normal rate and regular rhythm.  Exam reveals no cyanosis and no edema.     Pulmonary/Chest: Effort normal.        Abdominal: Soft. She exhibits no distension, no mass and no abdominal incision. There is no abdominal tenderness. There is no rebound and no guarding.             Musculoskeletal: Normal range of motion and moves all extremeties. No edema.       Neurological: She is alert and oriented to person, place, and time.    Skin: Skin is warm and dry. No cyanosis.    Psychiatric: She has a normal mood and affect.             Significant Labs:  Recent Lab Results       22  0541        Baso # 0.02       Basophil % 0.3       Differential Method Automated       Eos # 0.1       Eosinophil % 0.8       Gran # (ANC) 4.7       Gran % 72.2       Hematocrit 29.5       Hemoglobin 9.6       Immature Grans (Abs) 0.10  Comment: Mild elevation in immature granulocytes is non specific and   can be seen in a variety of conditions including stress response,   acute inflammation, trauma and pregnancy. Correlation with other   laboratory and clinical findings is essential.         Immature Granulocytes 1.5       Lymph # 1.0       Lymph % 15.6       MCH 30.1       MCHC 32.5       MCV 93       Mono # 0.6       Mono % 9.6       MPV 10.8       nRBC 0       Platelets 185       RBC 3.19       RDW 13.8       WBC 6.55             Assessment/Plan:     21 y.o. female  at 33w4d for:    Active Diagnoses:    Diagnosis Date Noted POA    PRINCIPAL PROBLEM:   premature rupture of membranes (PPROM) with unknown onset of labor [O42.919] 2022 Yes      Problems Resolved During this Admission:       1. PPROM antibiotics  - Azithromycin 1 gram po x 1 (received on 1/3/2022)  - Ampicillin 2 grams IV q 6 x 48 hrs (completed on 2022)  - Amoxicillin 500 mg po every 8 hrs x 5 days (completed 1 of 5 days)    2. Steroid prophylaxis  - BMZ 12 mg IM q 24 hrs x 2 doses (completed on 2022)    3. IOL scheduled 2022  - Monitor for signs of infection. Will induce sooner if needed.       Nolvia Hernandez MD  Obstetrics  [facility] - [facility] Delivery

## 2022-01-08 NOTE — PROGRESS NOTES
01/08/22 0554   [facility] Note - Strip   Strip Reviewed by [facility] Nurse? Yes   [facility] Note - Communication   [place] Nurse Communicated with Bedside Nurse Regarding: Fetal Status   [facility] Note - Notification   Nurse Notified? Yes   Name of Nurse Swati SO

## 2022-01-09 PROCEDURE — 25000003 PHARM REV CODE 250: Performed by: OBSTETRICS & GYNECOLOGY

## 2022-01-09 PROCEDURE — 11000001 HC ACUTE MED/SURG PRIVATE ROOM

## 2022-01-09 PROCEDURE — 99024 PR POST-OP FOLLOW-UP VISIT: ICD-10-PCS | Mod: ,,, | Performed by: OBSTETRICS & GYNECOLOGY

## 2022-01-09 PROCEDURE — 25000003 PHARM REV CODE 250: Performed by: STUDENT IN AN ORGANIZED HEALTH CARE EDUCATION/TRAINING PROGRAM

## 2022-01-09 PROCEDURE — 99024 POSTOP FOLLOW-UP VISIT: CPT | Mod: ,,, | Performed by: OBSTETRICS & GYNECOLOGY

## 2022-01-09 RX ADMIN — AMOXICILLIN 500 MG: 250 CAPSULE ORAL at 10:01

## 2022-01-09 RX ADMIN — AMOXICILLIN 500 MG: 250 CAPSULE ORAL at 01:01

## 2022-01-09 RX ADMIN — AMOXICILLIN 500 MG: 250 CAPSULE ORAL at 06:01

## 2022-01-09 RX ADMIN — FAMOTIDINE 20 MG: 20 TABLET ORAL at 10:01

## 2022-01-09 NOTE — PROGRESS NOTES
[facility]  Obstetrics  Antepartum Progress Note    Patient Name: Violette Bernal  MRN: [id number]  Admission Date: 2022  Hospital Length of Stay: 4 days  Attending Physician: Roger Garcia MD  Primary Care Provider: Primary Doctor No    Subjective:     Principal Problem: premature rupture of membranes (PPROM) with unknown onset of labor    HPI: Violette Bernal is a 21 y.o. female  with no prenatal care who presented on 2021 with complaint of significant amount of fluid and contractions. By ultrasound, patient 33 weeks with an RADHA of 2022.    Obstetric HPI:  Patient reports no contractions, active fetal movement, absent vaginal bleeding , absent loss of fluid      Objective:     Vital Signs (Most Recent):  Temp: 98.6 °F (37 °C) (22 0556)  Pulse: 110 (22 014)  Resp: 16 (22 1559)  BP: (!) 107/58 (22 014)  SpO2: (!) 94 % (22 014) Vital Signs (24h Range):  Temp:  [97.2 °F (36.2 °C)-99.6 °F (37.6 °C)] 98.6 °F (37 °C)  Pulse:  [110-124] 110  SpO2:  [94 %-96 %] 94 %  BP: (107-113)/(58-61) 107/58        There is no height or weight on file to calculate BMI.    FHT: 135 Cat 1 (reassuring)  TOCO:  Q 2 minutes    No intake or output data in the 24 hours ending 22 0812    Physical Exam:   Constitutional: She is oriented to person, place, and time. She appears well-developed and well-nourished.    HENT:   Head: Normocephalic and atraumatic.    Eyes: EOM are normal.     Cardiovascular: Regular rhythm.  Exam reveals no clubbing, no cyanosis and no edema.     Pulmonary/Chest: Effort normal.        Abdominal: Soft.             Musculoskeletal: Moves all extremeties.       Neurological: She is alert and oriented to person, place, and time.    Skin: Skin is warm and dry. No cyanosis. Nails show no clubbing.    Psychiatric: She has a normal mood and affect.       Significant Labs:  Recent Lab Results     None          Assessment/Plan:      y.o. female   at 33w5d for:    Active Diagnoses:    Diagnosis Date Noted POA    PRINCIPAL PROBLEM:   premature rupture of membranes (PPROM) with unknown onset of labor [O42.919] 2022 Yes      Problems Resolved During this Admission:       1. PPROM antibiotics  - Azithromycin 1 gram po x 1 (received on 1/3/2022)  - Ampicillin 2 grams IV q 6 x 48 hrs (completed on 2022)  - Amoxicillin 500 mg po every 8 hrs x 5 days (completed 2 of 5 days)     2. Steroid prophylaxis  - BMZ 12 mg IM q 24 hrs x 2 doses (completed on 2022)     3. IOL scheduled 2022  - Monitor for signs of infection. Will induce sooner if needed.       Nolvia Hernandez MD  Obstetrics  [facility] - Labor & Delivery

## 2022-01-10 LAB
ABO + RH BLD: NORMAL
BASOPHILS # BLD AUTO: 0.01 K/UL (ref 0–0.2)
BASOPHILS NFR BLD: 0.2 % (ref 0–1.9)
BLD GP AB SCN CELLS X3 SERPL QL: NORMAL
DIFFERENTIAL METHOD: ABNORMAL
EOSINOPHIL # BLD AUTO: 0.1 K/UL (ref 0–0.5)
EOSINOPHIL NFR BLD: 1.1 % (ref 0–8)
ERYTHROCYTE [DISTWIDTH] IN BLOOD BY AUTOMATED COUNT: 14.1 % (ref 11.5–14.5)
HCT VFR BLD AUTO: 33.3 % (ref 37–48.5)
HGB BLD-MCNC: 10.9 G/DL (ref 12–16)
IMM GRANULOCYTES # BLD AUTO: 0.08 K/UL (ref 0–0.04)
IMM GRANULOCYTES NFR BLD AUTO: 1.8 % (ref 0–0.5)
LYMPHOCYTES # BLD AUTO: 1.7 K/UL (ref 1–4.8)
LYMPHOCYTES NFR BLD: 37.5 % (ref 18–48)
MCH RBC QN AUTO: 30.4 PG (ref 27–31)
MCHC RBC AUTO-ENTMCNC: 32.7 G/DL (ref 32–36)
MCV RBC AUTO: 93 FL (ref 82–98)
MONOCYTES # BLD AUTO: 0.5 K/UL (ref 0.3–1)
MONOCYTES NFR BLD: 10.5 % (ref 4–15)
NEUTROPHILS # BLD AUTO: 2.2 K/UL (ref 1.8–7.7)
NEUTROPHILS NFR BLD: 48.9 % (ref 38–73)
NRBC BLD-RTO: 0 /100 WBC
PLATELET # BLD AUTO: 200 K/UL (ref 150–450)
PMV BLD AUTO: 10.8 FL (ref 9.2–12.9)
RBC # BLD AUTO: 3.59 M/UL (ref 4–5.4)
WBC # BLD AUTO: 4.56 K/UL (ref 3.9–12.7)

## 2022-01-10 PROCEDURE — 99231 SBSQ HOSP IP/OBS SF/LOW 25: CPT | Mod: ,,, | Performed by: OBSTETRICS & GYNECOLOGY

## 2022-01-10 PROCEDURE — 86900 BLOOD TYPING SEROLOGIC ABO: CPT | Performed by: OBSTETRICS & GYNECOLOGY

## 2022-01-10 PROCEDURE — 86850 RBC ANTIBODY SCREEN: CPT | Performed by: OBSTETRICS & GYNECOLOGY

## 2022-01-10 PROCEDURE — 36415 COLL VENOUS BLD VENIPUNCTURE: CPT | Performed by: OBSTETRICS & GYNECOLOGY

## 2022-01-10 PROCEDURE — 72100002 HC LABOR CARE, 1ST 8 HOURS

## 2022-01-10 PROCEDURE — 11000001 HC ACUTE MED/SURG PRIVATE ROOM

## 2022-01-10 PROCEDURE — 99231 PR SUBSEQUENT HOSPITAL CARE,LEVL I: ICD-10-PCS | Mod: ,,, | Performed by: OBSTETRICS & GYNECOLOGY

## 2022-01-10 PROCEDURE — 25000003 PHARM REV CODE 250: Performed by: STUDENT IN AN ORGANIZED HEALTH CARE EDUCATION/TRAINING PROGRAM

## 2022-01-10 PROCEDURE — 25000003 PHARM REV CODE 250: Performed by: OBSTETRICS & GYNECOLOGY

## 2022-01-10 PROCEDURE — 85025 COMPLETE CBC W/AUTO DIFF WBC: CPT | Performed by: OBSTETRICS & GYNECOLOGY

## 2022-01-10 PROCEDURE — 63600175 PHARM REV CODE 636 W HCPCS: Performed by: OBSTETRICS & GYNECOLOGY

## 2022-01-10 RX ORDER — CARBOPROST TROMETHAMINE 250 UG/ML
250 INJECTION, SOLUTION INTRAMUSCULAR
Status: CANCELLED | OUTPATIENT
Start: 2022-01-10

## 2022-01-10 RX ORDER — TRANEXAMIC ACID 100 MG/ML
1000 INJECTION, SOLUTION INTRAVENOUS ONCE AS NEEDED
Status: DISCONTINUED | OUTPATIENT
Start: 2022-01-10 | End: 2022-01-11 | Stop reason: ALTCHOICE

## 2022-01-10 RX ORDER — SODIUM CHLORIDE, SODIUM LACTATE, POTASSIUM CHLORIDE, CALCIUM CHLORIDE 600; 310; 30; 20 MG/100ML; MG/100ML; MG/100ML; MG/100ML
INJECTION, SOLUTION INTRAVENOUS CONTINUOUS
Status: DISCONTINUED | OUTPATIENT
Start: 2022-01-10 | End: 2022-01-11 | Stop reason: ALTCHOICE

## 2022-01-10 RX ORDER — OXYTOCIN/RINGER'S LACTATE 30/500 ML
0-30 PLASTIC BAG, INJECTION (ML) INTRAVENOUS CONTINUOUS
Status: DISCONTINUED | OUTPATIENT
Start: 2022-01-11 | End: 2022-01-11 | Stop reason: ALTCHOICE

## 2022-01-10 RX ORDER — MISOPROSTOL 100 MCG
50 TABLET ORAL EVERY 4 HOURS PRN
Status: DISCONTINUED | OUTPATIENT
Start: 2022-01-10 | End: 2022-01-11 | Stop reason: ALTCHOICE

## 2022-01-10 RX ORDER — METHYLERGONOVINE MALEATE 0.2 MG/ML
200 INJECTION INTRAVENOUS
Status: CANCELLED | OUTPATIENT
Start: 2022-01-10

## 2022-01-10 RX ORDER — TERBUTALINE SULFATE 1 MG/ML
0.25 INJECTION SUBCUTANEOUS
Status: DISCONTINUED | OUTPATIENT
Start: 2022-01-10 | End: 2022-01-11 | Stop reason: ALTCHOICE

## 2022-01-10 RX ORDER — DIPHENOXYLATE HYDROCHLORIDE AND ATROPINE SULFATE 2.5; .025 MG/1; MG/1
1 TABLET ORAL 4 TIMES DAILY PRN
Status: CANCELLED | OUTPATIENT
Start: 2022-01-10

## 2022-01-10 RX ADMIN — Medication 2 MILLI-UNITS/MIN: at 05:01

## 2022-01-10 RX ADMIN — FAMOTIDINE 20 MG: 20 TABLET ORAL at 08:01

## 2022-01-10 RX ADMIN — AMOXICILLIN 500 MG: 250 CAPSULE ORAL at 05:01

## 2022-01-10 RX ADMIN — SODIUM CHLORIDE, SODIUM LACTATE, POTASSIUM CHLORIDE, AND CALCIUM CHLORIDE: .6; .31; .03; .02 INJECTION, SOLUTION INTRAVENOUS at 05:01

## 2022-01-10 RX ADMIN — AMOXICILLIN 500 MG: 250 CAPSULE ORAL at 10:01

## 2022-01-10 RX ADMIN — Medication 50 MCG: at 08:01

## 2022-01-10 RX ADMIN — AMOXICILLIN 500 MG: 250 CAPSULE ORAL at 03:01

## 2022-01-10 NOTE — NURSING
1537   # [id number] used to discuss poc for SVE this evening, report to MD to determine IOL medication and time, shower with CHG, remove IV and start a new one, and blood to be drawn.     1635  Report to Dr Garcia via phone of pt SVE 3-3.5/thick/posterior.  Order rec'd to do a CST now, start cytotec 50 mcg @ 2000 and a 2nd dose at MN if needed then pitocin per protocol at 0400.  Will follow patient all night.  Informed that patient does not want to deliver with anesthesia.      1645  Dr Wiedemann at bedside. Consents for delivery and blood signed.   # [id number].

## 2022-01-10 NOTE — NURSING
7955  [employer] language line  # [id number] used to review poc for today. Reports still leaking some colorless fluid, no odor.  Abdomen non tender to touch.  Active fetal movement.  fht's normal range, moderate variability.  VSS.  Reports intermittent lower abdominal pain.  Informed that some contractions have been showing up on fetal monitor, irregualarly. Instructed to inform RN if has increased pain or vaginal bleeding. Informed MD will round to discuss IOL due to 34w after MN, will sign consents, need to change IV site, lab draw, wants to labor without anesthesia, breast and formula feed baby, discussed setting up a breast pump after delivery, will remain in hospital 2 days after delivery, and infant will remain in hospital longer due to prematurity.  Verbal understanding.  Questions answered.

## 2022-01-10 NOTE — NURSING
Dr Garcia called unit for update.  Report given.  Wants SVE this evening with notification to decide induction medication, cbc & t&s to be drawn.  Have on call MD consent for delivery and blood.

## 2022-01-10 NOTE — PROGRESS NOTES
[facility]  Obstetrics  Antepartum Progress Note    Patient Name: Violette Bernal  MRN: [id number]  Admission Date: 2022  Hospital Length of Stay: 5 days  Attending Physician: Roger Garcia MD  Primary Care Provider: Primary Doctor No    Subjective:     Principal Problem: premature rupture of membranes (PPROM) with unknown onset of labor    HPI: Violette Bernal is a 21 y.o. female  with no prenatal care who presented on 2021 with complaint of significant amount of fluid and contractions. By ultrasound, patient 33 weeks with an RADHA of 2022.    Obstetric HPI:  Patient reports no contractions, active fetal movement, absent vaginal bleeding , absent loss of fluid      Objective:     Vital Signs (Most Recent):  Temp: 98.9 °F (37.2 °C) (01/10/22 0315)  Pulse: 86 (01/10/22 0315)  Resp: 16 (22 1559)  BP: 103/64 (01/10/22 0315)  SpO2: 96 % (22 2300) Vital Signs (24h Range):  Temp:  [98.3 °F (36.8 °C)-98.9 °F (37.2 °C)] 98.9 °F (37.2 °C)  Pulse:  [] 86  SpO2:  [93 %-96 %] 96 %  BP: ()/(48-64) 103/64        There is no height or weight on file to calculate BMI.    FHT: 135 Cat 1 (reassuring)    No intake or output data in the 24 hours ending 01/10/22 0801    Physical Exam:   Constitutional: She is oriented to person, place, and time. She appears well-developed and well-nourished.    HENT:   Head: Normocephalic and atraumatic.    Eyes: EOM are normal.     Cardiovascular: Regular rhythm.  Exam reveals no clubbing, no cyanosis and no edema.     Pulmonary/Chest: Effort normal.        Abdominal: Soft.             Musculoskeletal: Moves all extremeties.       Neurological: She is alert and oriented to person, place, and time.    Skin: Skin is warm and dry. No cyanosis. Nails show no clubbing.    Psychiatric: She has a normal mood and affect.       Significant Labs:  Recent Lab Results     None          Assessment/Plan:     21 y.o. female  at 33w5d for:    Active  Diagnoses:    Diagnosis Date Noted POA    PRINCIPAL PROBLEM:   premature rupture of membranes (PPROM) with unknown onset of labor [O42.919] 2022 Yes      Problems Resolved During this Admission:       1. PPROM antibiotics  - Azithromycin 1 gram po x 2 (received on 1/3 and )  - Ampicillin 2 grams IV q 6 x 48 hrs (completed on 2022)  - Amoxicillin 500 mg po every 8 hrs x 5 days (completed 3 of 5 days)     2. Steroid prophylaxis  - BMZ 12 mg IM q 24 hrs x 2 doses (completed on 2022)     3. IOL scheduled 2022 at MN  - Monitor for signs of infection. Will induce sooner if needed.       Bravo Denise MD PGY-3  Obstetrics  [facility] - Labor & Delivery

## 2022-01-11 LAB
BASOPHILS # BLD AUTO: 0.01 K/UL (ref 0–0.2)
BASOPHILS NFR BLD: 0.1 % (ref 0–1.9)
DIFFERENTIAL METHOD: ABNORMAL
EOSINOPHIL # BLD AUTO: 0 K/UL (ref 0–0.5)
EOSINOPHIL NFR BLD: 0.4 % (ref 0–8)
ERYTHROCYTE [DISTWIDTH] IN BLOOD BY AUTOMATED COUNT: 13.8 % (ref 11.5–14.5)
HCT VFR BLD AUTO: 24.5 % (ref 37–48.5)
HGB BLD-MCNC: 8.3 G/DL (ref 12–16)
IMM GRANULOCYTES # BLD AUTO: 0.05 K/UL (ref 0–0.04)
IMM GRANULOCYTES NFR BLD AUTO: 0.7 % (ref 0–0.5)
LYMPHOCYTES # BLD AUTO: 2.1 K/UL (ref 1–4.8)
LYMPHOCYTES NFR BLD: 29.1 % (ref 18–48)
MCH RBC QN AUTO: 30.9 PG (ref 27–31)
MCHC RBC AUTO-ENTMCNC: 33.9 G/DL (ref 32–36)
MCV RBC AUTO: 91 FL (ref 82–98)
MONOCYTES # BLD AUTO: 0.7 K/UL (ref 0.3–1)
MONOCYTES NFR BLD: 9.2 % (ref 4–15)
NEUTROPHILS # BLD AUTO: 4.4 K/UL (ref 1.8–7.7)
NEUTROPHILS NFR BLD: 60.5 % (ref 38–73)
NRBC BLD-RTO: 0 /100 WBC
PLATELET # BLD AUTO: 187 K/UL (ref 150–450)
PMV BLD AUTO: 10.2 FL (ref 9.2–12.9)
RBC # BLD AUTO: 2.69 M/UL (ref 4–5.4)
WBC # BLD AUTO: 7.28 K/UL (ref 3.9–12.7)

## 2022-01-11 PROCEDURE — 88307 PR  SURG PATH,LEVEL V: ICD-10-PCS | Mod: 26,,, | Performed by: PATHOLOGY

## 2022-01-11 PROCEDURE — 63600175 PHARM REV CODE 636 W HCPCS: Performed by: OBSTETRICS & GYNECOLOGY

## 2022-01-11 PROCEDURE — 25000003 PHARM REV CODE 250: Performed by: OBSTETRICS & GYNECOLOGY

## 2022-01-11 PROCEDURE — 59409 PR OBSTETRICAL CARE,VAG DELIV ONLY: ICD-10-PCS | Mod: AT,,, | Performed by: OBSTETRICS & GYNECOLOGY

## 2022-01-11 PROCEDURE — 88307 TISSUE EXAM BY PATHOLOGIST: CPT | Mod: 26,,, | Performed by: PATHOLOGY

## 2022-01-11 PROCEDURE — 11000001 HC ACUTE MED/SURG PRIVATE ROOM

## 2022-01-11 PROCEDURE — 88307 TISSUE EXAM BY PATHOLOGIST: CPT | Performed by: PATHOLOGY

## 2022-01-11 PROCEDURE — 36415 COLL VENOUS BLD VENIPUNCTURE: CPT | Performed by: OBSTETRICS & GYNECOLOGY

## 2022-01-11 PROCEDURE — 85025 COMPLETE CBC W/AUTO DIFF WBC: CPT | Performed by: OBSTETRICS & GYNECOLOGY

## 2022-01-11 PROCEDURE — 72200005 HC VAGINAL DELIVERY LEVEL II

## 2022-01-11 PROCEDURE — 59409 OBSTETRICAL CARE: CPT | Mod: AT,,, | Performed by: OBSTETRICS & GYNECOLOGY

## 2022-01-11 RX ORDER — OXYTOCIN/RINGER'S LACTATE 30/500 ML
95 PLASTIC BAG, INJECTION (ML) INTRAVENOUS ONCE
Status: DISCONTINUED | OUTPATIENT
Start: 2022-01-11 | End: 2022-01-13 | Stop reason: HOSPADM

## 2022-01-11 RX ORDER — PROCHLORPERAZINE EDISYLATE 5 MG/ML
5 INJECTION INTRAMUSCULAR; INTRAVENOUS EVERY 6 HOURS PRN
Status: DISCONTINUED | OUTPATIENT
Start: 2022-01-11 | End: 2022-01-13 | Stop reason: HOSPADM

## 2022-01-11 RX ORDER — HYDROCORTISONE 25 MG/G
CREAM TOPICAL 3 TIMES DAILY PRN
Status: DISCONTINUED | OUTPATIENT
Start: 2022-01-11 | End: 2022-01-13 | Stop reason: HOSPADM

## 2022-01-11 RX ORDER — ACETAMINOPHEN 325 MG/1
650 TABLET ORAL EVERY 6 HOURS PRN
Status: DISCONTINUED | OUTPATIENT
Start: 2022-01-11 | End: 2022-01-13 | Stop reason: HOSPADM

## 2022-01-11 RX ORDER — OXYCODONE AND ACETAMINOPHEN 5; 325 MG/1; MG/1
1 TABLET ORAL EVERY 4 HOURS PRN
Status: DISCONTINUED | OUTPATIENT
Start: 2022-01-11 | End: 2022-01-13 | Stop reason: HOSPADM

## 2022-01-11 RX ORDER — MEDROXYPROGESTERONE ACETATE 150 MG/ML
150 INJECTION, SUSPENSION INTRAMUSCULAR
Status: DISCONTINUED | OUTPATIENT
Start: 2022-01-11 | End: 2022-01-13 | Stop reason: HOSPADM

## 2022-01-11 RX ORDER — ONDANSETRON 8 MG/1
8 TABLET, ORALLY DISINTEGRATING ORAL EVERY 8 HOURS PRN
Status: DISCONTINUED | OUTPATIENT
Start: 2022-01-11 | End: 2022-01-13 | Stop reason: HOSPADM

## 2022-01-11 RX ORDER — SIMETHICONE 80 MG
1 TABLET,CHEWABLE ORAL EVERY 6 HOURS PRN
Status: DISCONTINUED | OUTPATIENT
Start: 2022-01-11 | End: 2022-01-13 | Stop reason: HOSPADM

## 2022-01-11 RX ORDER — SODIUM CHLORIDE 0.9 % (FLUSH) 0.9 %
10 SYRINGE (ML) INJECTION
Status: DISCONTINUED | OUTPATIENT
Start: 2022-01-11 | End: 2022-01-13 | Stop reason: HOSPADM

## 2022-01-11 RX ORDER — IBUPROFEN 600 MG/1
600 TABLET ORAL EVERY 6 HOURS PRN
Status: DISCONTINUED | OUTPATIENT
Start: 2022-01-11 | End: 2022-01-13 | Stop reason: HOSPADM

## 2022-01-11 RX ORDER — DIPHENHYDRAMINE HYDROCHLORIDE 50 MG/ML
25 INJECTION INTRAMUSCULAR; INTRAVENOUS EVERY 4 HOURS PRN
Status: DISCONTINUED | OUTPATIENT
Start: 2022-01-11 | End: 2022-01-13 | Stop reason: HOSPADM

## 2022-01-11 RX ORDER — PRENATAL WITH FERROUS FUM AND FOLIC ACID 3080; 920; 120; 400; 22; 1.84; 3; 20; 10; 1; 12; 200; 27; 25; 2 [IU]/1; [IU]/1; MG/1; [IU]/1; MG/1; MG/1; MG/1; MG/1; MG/1; MG/1; UG/1; MG/1; MG/1; MG/1; MG/1
1 TABLET ORAL DAILY
Status: DISCONTINUED | OUTPATIENT
Start: 2022-01-11 | End: 2022-01-13 | Stop reason: HOSPADM

## 2022-01-11 RX ORDER — OXYCODONE AND ACETAMINOPHEN 10; 325 MG/1; MG/1
1 TABLET ORAL EVERY 4 HOURS PRN
Status: DISCONTINUED | OUTPATIENT
Start: 2022-01-11 | End: 2022-01-13 | Stop reason: HOSPADM

## 2022-01-11 RX ORDER — DIPHENHYDRAMINE HCL 25 MG
25 CAPSULE ORAL EVERY 4 HOURS PRN
Status: DISCONTINUED | OUTPATIENT
Start: 2022-01-11 | End: 2022-01-13 | Stop reason: HOSPADM

## 2022-01-11 RX ORDER — DOCUSATE SODIUM 100 MG/1
200 CAPSULE, LIQUID FILLED ORAL 2 TIMES DAILY PRN
Status: DISCONTINUED | OUTPATIENT
Start: 2022-01-11 | End: 2022-01-13 | Stop reason: HOSPADM

## 2022-01-11 RX ADMIN — OXYCODONE HYDROCHLORIDE AND ACETAMINOPHEN 1 TABLET: 5; 325 TABLET ORAL at 01:01

## 2022-01-11 RX ADMIN — OXYCODONE HYDROCHLORIDE AND ACETAMINOPHEN 1 TABLET: 5; 325 TABLET ORAL at 02:01

## 2022-01-11 RX ADMIN — IBUPROFEN 600 MG: 600 TABLET, FILM COATED ORAL at 10:01

## 2022-01-11 RX ADMIN — IBUPROFEN 600 MG: 600 TABLET, FILM COATED ORAL at 07:01

## 2022-01-11 RX ADMIN — PRENATAL VIT W/ FE FUMARATE-FA TAB 27-0.8 MG 1 TABLET: 27-0.8 TAB at 10:01

## 2022-01-11 RX ADMIN — ONDANSETRON 8 MG: 8 TABLET, ORALLY DISINTEGRATING ORAL at 03:01

## 2022-01-11 NOTE — PROGRESS NOTES
Pt 33 weeks srom,   See prv note  Expectant management for now, recd  Steroids  Has remained afebrile  No pain or contracitons  Uterus soft,   Explained consents  With translater  marikants done  Pt for induction tomorrow

## 2022-01-11 NOTE — LACTATION NOTE
Discussed preferred method of feeding baby.  Pt states that she is going to formula feed the baby.  Educated on benefits of feeding her  baby breast milk.  Pt verbalized understanding, but states she is going to formula feed.

## 2022-01-11 NOTE — L&D DELIVERY NOTE
[facility]  Vaginal Delivery   Operative Note    SUMMARY     Normal spontaneous vaginal delivery of live infant,  over intact perineum. No epidural  Nuchal cord: No.  OA position  Spontaneous delivery of placenta and IV pitocin given noting good uterine tone.  No lacerations noted.  Patient tolerated delivery well. Sponge needle and lap counted correctly x2.    Indications:  premature rupture of membranes (PPROM) with unknown onset of labor  Pregnancy complicated by:   Patient Active Problem List   Diagnosis     premature rupture of membranes (PPROM) with unknown onset of labor     Admitting GA: 34w0d    Delivery Information for Eliezer Bernal    Birth information:  YOB: 2022   Time of birth: 12:48 AM   Sex: female   Head Delivery Date/Time: 2022 12:48 AM   Delivery type: Vaginal, Spontaneous   Gestational Age: 34w0d    Delivery Providers    Delivering clinician: Roger Garcia MD   Provider Role    Rosa Elena Jiménez, SARAY Delivery Assist    Leo Varela RN Delivery Assist    Justina [place] Surgical Tech    Phuong Gates RN Delivery Assist            Measurements    Weight: 2205 g  Weight (lbs): 4 lb 13.8 oz  Length:          Apgars    Living status: Living  Apgars:  1 min.:  5 min.:  10 min.:  15 min.:  20 min.:    Skin color:  1  1       Heart rate:  2  2       Reflex irritability:  2  2       Muscle tone:  2  2       Respiratory effort:  2  2       Total:  9  9       Apgars assigned by: MEHNAZ SANTIAGO         Operative Delivery    Forceps attempted?: No  Vacuum extractor attempted?: No         Shoulder Dystocia    Shoulder dystocia present?: No           Presentation    Presentation: Vertex  Position: Occiput Anterior           Interventions/Resuscitation    Method: Tactile Stimulation, Bulb Suctioning       Cord    Vessels: 3 vessels  Complications: None  Delayed Cord Clamping?: Yes  Gases Sent?: No  Stem Cell Collection (by MD): No       Placenta     Placenta delivery date/time: 2022 0100  Placenta removal: Expressed  Placenta appearance: Intact  Placenta disposition: pathology           Labor Events:       labor: Yes     Labor Onset Date/Time:         Dilation Complete Date/Time:         Start Pushing Date/Time:         Start Pushing Date/Time:       Rupture Date/Time: 22           Rupture type:          Fluid Amount:       Fluid Color: Clear      Fluid Odor:       Membrane Status: PPROM (Premature Prolonged Rupture)               steroids: Full Course     Antibiotics given for GBS:       Induction: misoprostol     Indications for induction:  Prolonged ROM     Augmentation:       Indications for augmentation:       Labor complications:       Additional complications: Precipitous Delivery        Cervical ripening:                     Delivery:      Episiotomy: None     Indication for Episiotomy:       Perineal Lacerations: None Repaired:      Periurethral Laceration:   Repaired:     Labial Laceration:   Repaired:     Sulcus Laceration:   Repaired:     Vaginal Laceration:   Repaired:     Cervical Laceration:   Repaired:     Repair suture: None     Repair # of packets:       Last Value - EBL - Nursing (mL):       Sum - EBL - Nursing (mL): 0     Last Value - EBL - Anesthesia (mL):      Calculated QBL (mL):       Vaginal Sweep Performed: Yes     Surgicount Correct: Yes       Other providers:       Anesthesia    Method: None          Details (if applicable):  Trial of Labor      Categorization:      Priority:     Indications for :     Incision Type:       Additional  information:  Forceps:    Vacuum:    Breech:    Observed anomalies    Other (Comments):

## 2022-01-11 NOTE — PLAN OF CARE
Pt tolerating regular diet, voiding, passing gas, vss, nad.  No complaints of lightheaded or dizziness at present.  Pain managed well with ordered medication.

## 2022-01-11 NOTE — PROGRESS NOTES
PPD #0 s/p     Subjective: No complaints. Doing well. Mild cramps, no vb    Objective: BP (!) 98/54   Pulse 73   Temp 97.9 °F (36.6 °C) (Oral)   Resp 18   SpO2 98%   Breastfeeding Unknown     I/O last 3 completed shifts:  In: 4.7 [I.V.:4.7]  Out: 866 [Blood:866]  No intake/output data recorded.        H/H:   Lab Results   Component Value Date    WBC 4.56 01/10/2022    HGB 10.9 (L) 01/10/2022    HCT 33.3 (L) 01/10/2022    MCV 93 01/10/2022     01/10/2022       Chest: Clear to auscultation  CV: Regular rate and rhythm  Abdomen: Non-tender, non-distended, soft, positive bowel sounds  Fundus firm  Extremities: Non-tender, no edema    Assessment:PPD #0 S/P     Plan: Routine progressive care      Wants depo bf discharge    Anticipate d/c late  or early Thursday depending on  status  Order for depo placed

## 2022-01-11 NOTE — NURSING
Report to Dr Garcia via phone of negative CST.  Order rec'd to start po cytotec @ 2000.  Report given to GRAHAM Stephens RN

## 2022-01-12 LAB
C TRACH DNA SPEC QL NAA+PROBE: NOT DETECTED
N GONORRHOEA DNA SPEC QL NAA+PROBE: NOT DETECTED

## 2022-01-12 PROCEDURE — 90686 IIV4 VACC NO PRSV 0.5 ML IM: CPT | Performed by: OBSTETRICS & GYNECOLOGY

## 2022-01-12 PROCEDURE — 90471 IMMUNIZATION ADMIN: CPT | Performed by: OBSTETRICS & GYNECOLOGY

## 2022-01-12 PROCEDURE — 99231 PR SUBSEQUENT HOSPITAL CARE,LEVL I: ICD-10-PCS | Mod: ,,, | Performed by: OBSTETRICS & GYNECOLOGY

## 2022-01-12 PROCEDURE — 99231 SBSQ HOSP IP/OBS SF/LOW 25: CPT | Mod: ,,, | Performed by: OBSTETRICS & GYNECOLOGY

## 2022-01-12 PROCEDURE — 25000003 PHARM REV CODE 250: Performed by: OBSTETRICS & GYNECOLOGY

## 2022-01-12 PROCEDURE — 63600175 PHARM REV CODE 636 W HCPCS: Performed by: OBSTETRICS & GYNECOLOGY

## 2022-01-12 PROCEDURE — 11000001 HC ACUTE MED/SURG PRIVATE ROOM

## 2022-01-12 RX ADMIN — INFLUENZA VIRUS VACCINE 0.5 ML: 15; 15; 15; 15 SUSPENSION INTRAMUSCULAR at 12:01

## 2022-01-12 RX ADMIN — MEDROXYPROGESTERONE ACETATE 150 MG: 150 INJECTION, SUSPENSION, EXTENDED RELEASE INTRAMUSCULAR at 12:01

## 2022-01-12 RX ADMIN — IBUPROFEN 600 MG: 600 TABLET, FILM COATED ORAL at 08:01

## 2022-01-12 RX ADMIN — PRENATAL VIT W/ FE FUMARATE-FA TAB 27-0.8 MG 1 TABLET: 27-0.8 TAB at 08:01

## 2022-01-12 NOTE — PROGRESS NOTES
[facility]  Obstetrics  Postpartum Progress Note    Patient Name: Violette Bernal  MRN: [id number]  Admission Date: 2022  Hospital Length of Stay: 7 days  Attending Physician: Roger Garcia MD  Primary Care Provider: Primary Doctor No    Subjective:     Principal Problem:Vaginal delivery    Hospital Course:  No notes on file    Interval History: Patient is ppd 1 s/p  at 34 weeks. Currently doing well without any major complaints.    She is doing well this morning. She is tolerating a regular diet without nausea or vomiting. She is voiding spontaneously. She is ambulating. She has passed flatus, and has not a BM. Vaginal bleeding is mild. She denies fever or chills. Abdominal pain is mild and controlled with oral medications. She Is not breastfeeding.     Objective:     Vital Signs (Most Recent):  Temp: 98.4 °F (36.9 °C) (22)  Pulse: 73 (22)  Resp: 18 (22)  BP: (!) 98/59 (22)  SpO2: 99 % (22) Vital Signs (24h Range):  Temp:  [98.2 °F (36.8 °C)-98.6 °F (37 °C)] 98.4 °F (36.9 °C)  Pulse:  [72-81] 73  Resp:  [18] 18  SpO2:  [99 %] 99 %  BP: ()/(59-65) 98/59        There is no height or weight on file to calculate BMI.    No intake or output data in the 24 hours ending 22 0811      Significant Labs:  Lab Results   Component Value Date    GROUPTRH A POS 01/10/2022    HEPBSAG Negative 2022    STREPBCULT No Group B Streptococcus isolated 2022     Recent Labs   Lab 22  1351   HGB 8.3*   HCT 24.5*       I have personallly reviewed all pertinent lab results from the last 24 hours.    Physical Exam:   Constitutional: She is oriented to person, place, and time. She appears well-developed and well-nourished. No distress.    HENT:   Head: Normocephalic and atraumatic.      Cardiovascular: Normal rate, regular rhythm and normal heart sounds.     Pulmonary/Chest: Effort normal. No respiratory distress.        Abdominal: Soft.  There is no abdominal tenderness.             Musculoskeletal: Normal range of motion.       Neurological: She is alert and oriented to person, place, and time.    Skin: Skin is warm and dry. She is not diaphoretic.        Assessment/Plan:     21 y.o. female  for:    1. Spontaneous vaginal delivery   - currently doing well   - continue routine care   - encourage ambulation   - patient requesting depo for contraception     Disposition: As patient meets milestones, will plan to discharge tomorrow.    Bravo Denise MD PGY-3  Obstetrics  [facility] - [facility]

## 2022-01-12 NOTE — SUBJECTIVE & OBJECTIVE
Interval History: Patient is ppd 1 s/p  at 34 weeks. Currently doing well without any major complaints.    She is doing well this morning. She is tolerating a regular diet without nausea or vomiting. She is voiding spontaneously. She is ambulating. She has passed flatus, and has not a BM. Vaginal bleeding is mild. She denies fever or chills. Abdominal pain is mild and controlled with oral medications. She Is not breastfeeding.     Objective:     Vital Signs (Most Recent):  Temp: 98.4 °F (36.9 °C) (22)  Pulse: 73 (22)  Resp: 18 (22)  BP: (!) 98/59 (22)  SpO2: 99 % (22) Vital Signs (24h Range):  Temp:  [98.2 °F (36.8 °C)-98.6 °F (37 °C)] 98.4 °F (36.9 °C)  Pulse:  [72-81] 73  Resp:  [18] 18  SpO2:  [99 %] 99 %  BP: ()/(59-65) 98/59        There is no height or weight on file to calculate BMI.    No intake or output data in the 24 hours ending 22 0811      Significant Labs:  Lab Results   Component Value Date    GROUPTRH A POS 01/10/2022    HEPBSAG Negative 2022    STREPBCULT No Group B Streptococcus isolated 2022     Recent Labs   Lab 22  1351   HGB 8.3*   HCT 24.5*       I have personallly reviewed all pertinent lab results from the last 24 hours.    Physical Exam:   Constitutional: She is oriented to person, place, and time. She appears well-developed and well-nourished. No distress.    HENT:   Head: Normocephalic and atraumatic.      Cardiovascular: Normal rate, regular rhythm and normal heart sounds.     Pulmonary/Chest: Effort normal. No respiratory distress.        Abdominal: Soft. There is no abdominal tenderness.             Musculoskeletal: Normal range of motion.       Neurological: She is alert and oriented to person, place, and time.    Skin: Skin is warm and dry. She is not diaphoretic.

## 2022-01-12 NOTE — PLAN OF CARE
s/p  on 22.  baby @ 34 wks in NICU. Pt visits baby often. Pt recovering well. Pain well controlled with minimal pain medications.

## 2022-01-12 NOTE — NURSING
Pt ambulated without difficulty to NICU to visit her baby. Will let me know when she is back in her room.

## 2022-01-13 VITALS
OXYGEN SATURATION: 100 % | RESPIRATION RATE: 18 BRPM | DIASTOLIC BLOOD PRESSURE: 68 MMHG | SYSTOLIC BLOOD PRESSURE: 103 MMHG | HEART RATE: 89 BPM | TEMPERATURE: 99 F

## 2022-01-13 PROCEDURE — 25000003 PHARM REV CODE 250: Performed by: OBSTETRICS & GYNECOLOGY

## 2022-01-13 PROCEDURE — 99238 PR HOSPITAL DISCHARGE DAY,<30 MIN: ICD-10-PCS | Mod: ,,, | Performed by: OBSTETRICS & GYNECOLOGY

## 2022-01-13 PROCEDURE — 99238 HOSP IP/OBS DSCHRG MGMT 30/<: CPT | Mod: ,,, | Performed by: OBSTETRICS & GYNECOLOGY

## 2022-01-13 RX ORDER — IBUPROFEN 600 MG/1
600 TABLET ORAL EVERY 6 HOURS
Qty: 40 TABLET | Refills: 0 | Status: SHIPPED | OUTPATIENT
Start: 2022-01-13

## 2022-01-13 RX ORDER — HYDROCODONE BITARTRATE AND ACETAMINOPHEN 5; 325 MG/1; MG/1
1 TABLET ORAL EVERY 4 HOURS PRN
Qty: 10 TABLET | Refills: 0 | Status: SHIPPED | OUTPATIENT
Start: 2022-01-13

## 2022-01-13 RX ADMIN — IBUPROFEN 600 MG: 600 TABLET, FILM COATED ORAL at 09:01

## 2022-01-13 RX ADMIN — PRENATAL VIT W/ FE FUMARATE-FA TAB 27-0.8 MG 1 TABLET: 27-0.8 TAB at 09:01

## 2022-01-13 RX ADMIN — OXYCODONE AND ACETAMINOPHEN 1 TABLET: 10; 325 TABLET ORAL at 06:01

## 2022-01-13 NOTE — PROGRESS NOTES
[facility]  Obstetrics  Postpartum Progress Note    Patient Name: Violette Bernal  MRN: [id number]  Admission Date: 2022  Hospital Length of Stay: 8 days  Attending Physician: Roger Garcia MD  Primary Care Provider: Primary Doctor No    Subjective:     Principal Problem:Vaginal delivery    Hospital Course:  No notes on file    Interval History: Patient is ppd 2 s/p  at 34 weeks. Currently doing well without any major complaints.    She is doing well this morning. She is tolerating a regular diet without nausea or vomiting. She is voiding spontaneously. She is ambulating. She has passed flatus, and has a BM. Vaginal bleeding is mild. She denies fever or chills. Abdominal pain is mild and controlled with oral medications. She Is not breastfeeding.     Objective:     Vital Signs (Most Recent):  Temp: 98.8 °F (37.1 °C) (22)  Pulse: 85 (22)  Resp: 16 (22 0650)  BP: (!) 93/51 (22)  SpO2: 99 % (22 0424) Vital Signs (24h Range):  Temp:  [98.3 °F (36.8 °C)-99.1 °F (37.3 °C)] 98.8 °F (37.1 °C)  Pulse:  [68-85] 85  Resp:  [16-18] 16  BP: ()/(51-69) 93/51        There is no height or weight on file to calculate BMI.    No intake or output data in the 24 hours ending 22 0753      Significant Labs:  Lab Results   Component Value Date    GROUPTRH A POS 01/10/2022    HEPBSAG Negative 2022    STREPBCULT No Group B Streptococcus isolated 2022     Recent Labs   Lab 22  1351   HGB 8.3*   HCT 24.5*       I have personallly reviewed all pertinent lab results from the last 24 hours.    Physical Exam:   Constitutional: She is oriented to person, place, and time. She appears well-developed and well-nourished. No distress.    HENT:   Head: Normocephalic and atraumatic.    Eyes: Pupils are equal, round, and reactive to light. EOM are normal.     Cardiovascular: Normal rate, regular rhythm and normal heart sounds.     Pulmonary/Chest: Effort  normal. No respiratory distress.        Abdominal: Soft. There is no abdominal tenderness.             Musculoskeletal: Normal range of motion.       Neurological: She is alert and oriented to person, place, and time.    Skin: Skin is warm and dry. She is not diaphoretic.        Assessment/Plan:     21 y.o. female  for:    1. Spontaneous vaginal delivery              - currently doing well ppd2              - continue routine care              - encourage ambulation              - patient requesting depo for contraception     Disposition: As patient meets milestones, will plan to discharge today.    Bravo Denise MD PGY-3  Obstetrics  [facility] - [facility]

## 2022-01-13 NOTE — PLAN OF CARE
Patient ambulating freely in room. POC reviewed with pt; able to verbalize acceptance and understanding. Tolerating regular diet. Voiding spontaneously. Pt visits infant in NICU frequently throughout night. VSS. Call light in reach. NAD noted. Will continue to monitor.

## 2022-01-13 NOTE — DISCHARGE INSTRUCTIONS
"Instrucciones Para Reinier de Agustina    Instrucciones a Seguir    Dieta regular  Actividad: Aumentarntar gradualmente  Sukhdeep: Regadera o Lilian  Restricciones: No levantar nada pesado  Cuidado Personal: No tampones o duchas vaginales  Actividad Sexual: Theresa relaciones sexuales  Planificacion Familiar: Consulta con echavarria medico  Cuidado de los Senos: Use un sosten de soporte  Regresar al trabajo/escuela: Cuando echavarria medico le indique    Cuando debe llamar al Doctor    *Fiebre de 100.4 o mas alto  *Nausea/Vomito persistente  *Secrecion de la incision  *Ron sangrado vaginal o coagulos  *Inflamacion/dolor en los brazos o las piernas  *Severo dolor de lubna, vision borrosa or desmayos  *Frequencia/ardor urinario  *Senales de depresion post-parto        La Depresion Post-Parto    Usted acaba de tener un ronit'.  A pesar de que sabe que deberia sentirse emocionada y bills, lo que seinte son ganas de llorar sin motivo y tiene dificultades para realizar echo tareas diarias.  Usted pasa la mayor parte del tiempo emir, cansada y desesperanzada, y hasta podria sentirse avergonzada o culpable.  Aida lo que le esta sucediendo no es culpa suya, y puede sentirse mejor.  Hable con echavarria medico para que la ayude.     La Depresion Despues del Parto    Dickson vez sienta cansancio y ganas de llorar xuan despues de reinier a margi.  Esta etapa melancolica, denominada "baby blues", puede hacerla sentir emir y desesperanzada, o temerosa de que algo deann le vaya a pasar al ronit.  Algunas mujeres hasta llegan a poner en elvie el que puedan ser buenas madres.    Que' es la Depresion?    La depresion es un trastorno del animo que afecta echavarria manera de pernsar y de sentir.  El sintoma mas frecuente es un sentimiento de honda tristeza: tambien podria causane la sensacion de que ya no puede sobrellevar la brigitte.    Otros sintomas incluyen:      * Ganar o perder mucho peso  * Dormir en exceso o demasiado poco  * Estar cansada todo el tiempo  * Sentirse inquieta  * " Tener miedo de querer herir al ronit'  * No tener interes por el ronit'  * Sentirse inutil o culpable   * No encontrar placer en las cosas que solia gustarle hacer  * Dificultades para pensar con claridad o karie decisiones  * Pensar sobre la muerte o el suicidio     Que' Causa la Depresion Postparto?    La causea exacta de la depresion postarto se desconce, aunque posiblemente es el resultado de los cambios hormonales que suceden chauncey y despues del parto.  Tambien puede deberse al cansancio que le causan las exigencias del ronit' y el proceso de adaptacion a echavarria maternidad.  Todos estos factores podrian deprimirla.  En algunos casos, existe darshana predisposicion genetica a mendel tipo de depresion.    La depresion puede tratarse    Lo scott es que hay muchas maneras de tratar la depresion postparo.  Emprenda el primer paso para sentirse mejor hablando con echavarria medico.     Recursos    * National Institutes of Mental Health-- 711-182-7484     www.nimh.nih.gov    * National [place] on Mental Illness -- 525-953-3252     Www.elly.org    * Mental Health [place] --  [phone]     Www.[facility].org    * National Suidide Hotline -- 242.172.3273    5675-9153 [employer]  Todos los derechos reservados.  Esta informacion no pretende sustituir las atencion medica profesional.  Solo echavarria medico puede diagnosticar y tratar un problema de annette.         Instrucciones de la lactancia maternal para los bebes recién nacidos:    La Academia de Pediatra Americana recomienda la leche maternal nina la unica Jonna de nutrición para echavarria bebé chauncey los primeros 6 meses de la brigitte, y puede continuar, con la introducción de comida, hasta y despues de 1 ano de edad. Informado sobre de jesus consejos: Hay muchos beneficios de amamantar para el annette de la madre y del bebé. Theresa los chupones, teteras, o botellas por lo menos por las primeras 4 semanas. Usar los chupones, teteras, o botellas pueden interumpir el proceso de amamantar.     Alimenta en cuanto hay muestras de hambre:  o Calli en la boca, hacienda movimientos de succionar.  o Ruiditos suavecitos o estirarse  o Llorar es un signo de hambre tardío: no esperes a que el bebé llore.   Es de esperarse que haya 8-12 alimentaciones por 24 horas, aunque estas alimentaciones no sigan un horario definido.   Alterna el seno con el que empiezas, o empieza con el seno que se siente más lleno.   Cambia de lado cuando el bebé empiece a tragar más despacio o se desconecte del seno.   Esta howard si el bebé no come del fernanda seno en cada alimentación.   Si el bebé esta muy dormido o somnoliente: el contacto de piel a piel puede animarlo a empezar a comer:  o Quítale la camiseta y acuéstalo sobre tu pecho desnudo   Duerme cerca de tu bebé, aun en la casa. Aprende a reinier pecho acostada.   En la posición correcta los dos estarán cómodos:  o barbilla con seno, pecho con pecho  o Labio del bebé abierto hacia afuera para tragar: el labio del bebé debe estar volteado hacia afuera  o Boca abierta howard zoraida: la boca del bebé cubre la mayoría de la areola (el área oscura del seno)--no nada más el pezón   Fíjate en los signos de que hay transferencia de leche:  o Puedes oír al bebé tragar.  o No se oyen ruidos más o menos davina nina clic.  o El bebé no demuestra que tiene más hambre después de la alimentación.  o El cuerpo del bebé y echo calli están relajados por un tiempo corto.   Lo que entra, tiene que salir. Está pendiente de:  o Al cuarto día, por lo menos 3 cacas al día.  o La samson cambia de ronda a cynthia o café y luego a amarillo más líquido cuando baja tu leche.  o Después del cuarto día, por lo menos 6 paòales mojados/pesados al día.  o La orina debe ser de color amarillo sarah cuando baja tu leche.   Debes karie agua cuando tienes sed y comer alimentos sanos cuando tiene hambre. Rosa siesta para descansar lo suficiente.    No tomes medicamentos o alcohol sino con el consejo de echavarria  doctor. Puedes llamar al [facility]): ([phone]), Lunes a Vier[place], 8am-5pm, para obtener información sobre los medicamentos y la leche maternal.   La braulio de seguimiento con la pediatra debe ser 2 días después de salir del hospital. El bebé deberia mitzy ganado el peso de nacimiento cuando tiene 10-14 días de brigitte.

## 2022-01-13 NOTE — PLAN OF CARE
SW visited pt's room to complete discharge planning assessment. Pt was sleeping.      SW will return to complete assessment.          01/13/22 0838   OB Discharge Planning Assessment   Assessment Type Discharge Planning Assessment

## 2022-01-13 NOTE — PLAN OF CARE
Note copied from Infant's chart  (MRN: [id number])     SOCIAL WORK DISCHARGE PLANNING ASSESSMENT    Sw completed discharge planning assessment with pt's mother in mother's room K302 with assistance from  [facility] ID# [id number].  Pt's mother was easily engaged and education on the role of  was provided. Pt's mother reported she has obtained necessities for patient, excluding a car seat. Pt's mother advised she has the resources and will obtain a car seat for patient prior to discharge. Pt's paternal aunt will Lizet Cueot will provide transportation to family home at discharge. Pt's mother advised she has good family support and family will provide assistance as needed. Resource information on [facility] benefits and [employer] were provided and pt's mother reported no other needs at this time. SW left discharge brochure and contact information. Pt's mother was encouraged to call with any questions or concerns. Pt's mother verbalized understanding.       Legal Name: Clary Gtz  :  2022  Address: [address] [place] [address] [place]  [place]elle Alvarado  [id number]  Parent's Phone Numbers: [phone] ( Mother- Violette Bernal)     Pediatrician: Dr. Geraldine Fernandes       Potential Eligibility for SSI Benefits: Yes. Sw to provide diagnosis letter for application process.      Patient Active Problem List   Diagnosis    Single liveborn infant delivered vaginally      infant of 34 completed weeks of gestation    At risk for sepsis         Birth Hospital:[facility]   RADHA: 2022     Birth Weight: 2.205 kg (4 lb 13.8 oz)  Birth Length: 46 cm  Gestational Age: 34w0d          Apgars    Living status: Living  Apgars:  1 min.:  5 min.:  10 min.:  15 min.:  20 min.:    Skin color:  1  1       Heart rate:  2  2       Reflex irritability:  2  2       Muscle tone:  2  2       Respiratory effort:  2  2       Total:  9  9       Apgars assigned by: MEHNAZ SANTIAGO              01/13/22 1203   OB Discharge Planning Assessment   Assessment Type Discharge Planning Assessment   Source of Information family; utilized  (Violette Bernal [phone] ( Mother) &  Radha ID# [id number])   Verified Demographic and Insurance Information Yes   Insurance Medicaid   Medicaid [employer]   Medicaid Insurance Primary   Spiritual Affiliation Confucianist   Name of Support/Comfort Primary Source Violette Bernal  [phone] ( Mother)   Family Involvement High   Primary Contact Name and Number Violette Bernal [phone] ( Mother)   Received Prenatal Care Yes   Transportation Anticipated family or friend will provide    Arrangements Family;Friends;Day Care   Infant Feeding Plan breastfeeding;formula feeding   Breast Pump Needed other (see comments)  (pt's mother reported she does not want a breast pump)   Does baby have crib or safe sleep space? Yes   Do you have a car seat? Yes   Has other essential care items? Clothing;Bottles;Diapers   Pediatrician Dr. Geraldine Fernandes   Resources/Education Provided [facility]   DCFS No indications (Indicators for Report)   Discharge Plan A Home with family

## 2022-01-13 NOTE — HOSPITAL COURSE
Pt is a 22 yo  PPD 2 from vaginal delivery. She was admitted on 22 for PPROM. Patient received azithromycin 1g x2 doses, ampicillin 2g q6 q48hrs, and 4/5 days of amoxicillin prior to delivery as well as two doses of BMZ. Antepartum course was otherwise uncomplicated while in the hospital. She delivered a female infant weighing 2205g (4lb 13.8oz) at 0048 on 22.  By PPD 2, she was voiding without difficulty, ambulating well, tolerating a diet, and passing flatus. Her pain is well controlled. H/H is 8.3/24.5. She was desiring discharge home. She was given discharge teaching and instructions prior to leaving the hospital.  Pt voiced understanding of all instructions.

## 2022-01-13 NOTE — DISCHARGE SUMMARY
"[facility]  Obstetrics  Discharge Summary      Patient Name: Violette Bernal  MRN: [id number]  Admission Date: 2022  Hospital Length of Stay: 8 days  Discharge Date and Time:  2022 8:09 AM  Attending Physician: Roger Garcia MD   Discharging Provider: Bravo Denise MD   Primary Care Provider: Primary Doctor No    HPI: Patient is a 21 year old  female with no known PMHx who presents with suspected PPROM. Patient reports that she was in he rusual state of health until the day of presentation during which she reported feeling a significant amount of loss of fluid as well as contractions prompting her to present to the hospital. Denies any other recent complaints or problems. Feeling fetal movement. Patient had not received any prenatal care throughout this pregnancy including no US but stated that she thinks she is about 37 weeks pregnant. Denies noticing any problems throughout this pregnancy. Reports two previous pregnancies both delivered vaginally around 39 weeks. Denies any complications with those pregnancies.      US upon arrival here revealed "Live intrauterine gestation in cephalic presentation with a composite gestational age by ultrasound of 33 weeks.  Estimated fetal weight of 2063 grams +/-302 grams or 4 pounds 9 ounces +/-11 ounces.  Amniotic fluid index of 6.9 cm."           * No surgery found *     Hospital Course:   Pt is a 20 yo  PPD 2 from vaginal delivery. She was admitted on 22 for PPROM. Patient received azithromycin 1g x2 doses, ampicillin 2g q6 q48hrs, and 4/5 days of amoxicillin prior to delivery as well as two doses of BMZ. Antepartum course was otherwise uncomplicated while in the hospital. She delivered a female infant weighing 2205g (4lb 13.8oz) at 0048 on 22.  By PPD 2, she was voiding without difficulty, ambulating well, tolerating a diet, and passing flatus. Her pain is well controlled. H/H is 8.3/24.5. She was desiring discharge home. " She was given discharge teaching and instructions prior to leaving the hospital.  Pt voiced understanding of all instructions.            Final Active Diagnoses:    Diagnosis Date Noted POA    PRINCIPAL PROBLEM:  Vaginal delivery [O80] 01/12/2022 Not Applicable      Problems Resolved During this Admission:        Significant Diagnostic Studies: Labs:   CBC   Recent Labs   Lab 01/11/22  1351   WBC 7.28   HGB 8.3*   HCT 24.5*            Feeding Method: bottle    Immunizations     Date Immunization Status Dose Route/Site Given by    01/11/22 0227 MMR Incomplete 0.5 mL Subcutaneous/     01/11/22 0227 Tdap Incomplete 0.5 mL Intramuscular/     01/12/22 1256 Influenza - Quadrivalent - PF *Preferred* (6 months and older) Deleted 0.5 mL Intramuscular/     01/12/22 1255 Influenza - Quadrivalent - PF *Preferred* (6 months and older) Given 0.5 mL Intramuscular/Left arm Olga Lidia Fernandez RN          Delivery:    Episiotomy: None   Lacerations: None   Repair suture: None   Repair # of packets:     Blood loss (ml):       Birth information:  YOB: 2022   Time of birth: 12:48 AM   Sex: female   Delivery type: Vaginal, Spontaneous   Gestational Age: 34w0d    Delivery Clinician:      Other providers:       Additional  information:  Forceps:    Vacuum:    Breech:    Observed anomalies      Living?:           APGARS  One minute Five minutes Ten minutes   Skin color:         Heart rate:         Grimace:         Muscle tone:         Breathing:         Totals: 9  9        Placenta: Delivered:       appearance    Pending Diagnostic Studies:     Procedure Component Value Units Date/Time    Specimen to Pathology, Surgery Gynecology and Obstetrics [[id number]] Collected: 01/11/22 0048    Order Status: Sent Lab Status: In process Updated: 01/12/22 0858          Discharged Condition: stable    Disposition: Home or Self Care    Follow Up:   Follow-up Information     Roger Leon MD In 6 weeks.    Specialty: Obstetrics  and Gynecology  Contact information:  [address] [place] [address]  [address]  [place] [address]  [phone]                       Patient Instructions:      Diet Adult Regular     Notify your health care provider if you experience any of the following:  temperature >100.4     Notify your health care provider if you experience any of the following:  persistent nausea and vomiting or diarrhea     Notify your health care provider if you experience any of the following:  severe uncontrolled pain     Notify your health care provider if you experience any of the following:  redness, tenderness, or signs of infection (pain, swelling, redness, odor or green/yellow discharge around incision site)     Notify your health care provider if you experience any of the following:  difficulty breathing or increased cough     Notify your health care provider if you experience any of the following:  severe persistent headache     Notify your health care provider if you experience any of the following:  worsening rash     Notify your health care provider if you experience any of the following:  persistent dizziness, light-headedness, or visual disturbances     Notify your health care provider if you experience any of the following:  increased confusion or weakness     Pelvic Rest   Order Comments: For 6 weeks     Activity as tolerated     Medications:  There are no discharge medications for this patient.      Bravo Denise MD  Obstetrics  [facility]

## 2022-01-13 NOTE — SUBJECTIVE & OBJECTIVE
Interval History: Patient is ppd 2 s/p  at 34 weeks. Currently doing well without any major complaints.    She is doing well this morning. She is tolerating a regular diet without nausea or vomiting. She is voiding spontaneously. She is ambulating. She has passed flatus, and has a BM. Vaginal bleeding is mild. She denies fever or chills. Abdominal pain is mild and controlled with oral medications. She Is not breastfeeding.     Objective:     Vital Signs (Most Recent):  Temp: 98.8 °F (37.1 °C) (22 021)  Pulse: 85 (22)  Resp: 16 (22 0650)  BP: (!) 93/51 (22)  SpO2: 99 % (22 0424) Vital Signs (24h Range):  Temp:  [98.3 °F (36.8 °C)-99.1 °F (37.3 °C)] 98.8 °F (37.1 °C)  Pulse:  [68-85] 85  Resp:  [16-18] 16  BP: ()/(51-69) 93/51        There is no height or weight on file to calculate BMI.    No intake or output data in the 24 hours ending 22 0753      Significant Labs:  Lab Results   Component Value Date    GROUPTRH A POS 01/10/2022    HEPBSAG Negative 2022    STREPBCULT No Group B Streptococcus isolated 2022     Recent Labs   Lab 22  1351   HGB 8.3*   HCT 24.5*       I have personallly reviewed all pertinent lab results from the last 24 hours.    Physical Exam:   Constitutional: She is oriented to person, place, and time. She appears well-developed and well-nourished. No distress.    HENT:   Head: Normocephalic and atraumatic.    Eyes: Pupils are equal, round, and reactive to light. EOM are normal.     Cardiovascular: Normal rate, regular rhythm and normal heart sounds.     Pulmonary/Chest: Effort normal. No respiratory distress.        Abdominal: Soft. There is no abdominal tenderness.             Musculoskeletal: Normal range of motion.       Neurological: She is alert and oriented to person, place, and time.    Skin: Skin is warm and dry. She is not diaphoretic.

## 2022-01-14 LAB
FINAL PATHOLOGIC DIAGNOSIS: NORMAL
GROSS: NORMAL
Lab: NORMAL

## 2022-06-17 ENCOUNTER — PATIENT MESSAGE (OUTPATIENT)
Dept: ADMINISTRATIVE | Facility: HOSPITAL | Age: 22
End: 2022-06-17
Payer: MEDICAID

## 2022-12-19 PROBLEM — O42.919 PRETERM PREMATURE RUPTURE OF MEMBRANES (PPROM) WITH UNKNOWN ONSET OF LABOR: Status: RESOLVED | Noted: 2022-01-05 | Resolved: 2022-12-19
